# Patient Record
Sex: FEMALE | Race: WHITE | Employment: FULL TIME | URBAN - METROPOLITAN AREA
[De-identification: names, ages, dates, MRNs, and addresses within clinical notes are randomized per-mention and may not be internally consistent; named-entity substitution may affect disease eponyms.]

---

## 2017-01-27 ENCOUNTER — OFFICE VISIT (OUTPATIENT)
Dept: FAMILY MEDICINE CLINIC | Age: 57
End: 2017-01-27

## 2017-01-27 VITALS
SYSTOLIC BLOOD PRESSURE: 128 MMHG | DIASTOLIC BLOOD PRESSURE: 76 MMHG | HEIGHT: 65 IN | WEIGHT: 186.8 LBS | BODY MASS INDEX: 31.12 KG/M2 | TEMPERATURE: 98 F

## 2017-01-27 DIAGNOSIS — J01.10 ACUTE FRONTAL SINUSITIS, RECURRENCE NOT SPECIFIED: Primary | ICD-10-CM

## 2017-01-27 PROCEDURE — 99213 OFFICE O/P EST LOW 20 MIN: CPT | Performed by: INTERNAL MEDICINE

## 2017-01-27 RX ORDER — CEFUROXIME AXETIL 250 MG/1
250 TABLET ORAL 2 TIMES DAILY
Qty: 20 TABLET | Refills: 0 | Status: SHIPPED | OUTPATIENT
Start: 2017-01-27 | End: 2017-02-06

## 2017-01-27 ASSESSMENT — ENCOUNTER SYMPTOMS
ABDOMINAL PAIN: 0
RHINORRHEA: 1
SHORTNESS OF BREATH: 0
COUGH: 0
SINUS PRESSURE: 1

## 2017-05-24 ENCOUNTER — TELEPHONE (OUTPATIENT)
Dept: FAMILY MEDICINE CLINIC | Age: 57
End: 2017-05-24

## 2017-05-24 RX ORDER — SIMVASTATIN 40 MG
TABLET ORAL
Qty: 90 TABLET | Refills: 0 | Status: SHIPPED | OUTPATIENT
Start: 2017-05-24 | End: 2017-07-26

## 2017-06-13 RX ORDER — GLUCOSAMINE HCL/CHONDROITIN SU 500-400 MG
CAPSULE ORAL
Qty: 200 STRIP | Refills: 5 | Status: SHIPPED | OUTPATIENT
Start: 2017-06-13 | End: 2017-06-19 | Stop reason: DRUGHIGH

## 2017-06-19 ENCOUNTER — TELEPHONE (OUTPATIENT)
Dept: FAMILY MEDICINE CLINIC | Age: 57
End: 2017-06-19

## 2017-06-19 RX ORDER — GLUCOSAMINE HCL/CHONDROITIN SU 500-400 MG
CAPSULE ORAL
Qty: 200 STRIP | Refills: 5 | Status: SHIPPED | OUTPATIENT
Start: 2017-06-19 | End: 2018-08-06 | Stop reason: SDUPTHER

## 2017-07-26 ENCOUNTER — OFFICE VISIT (OUTPATIENT)
Dept: FAMILY MEDICINE CLINIC | Age: 57
End: 2017-07-26

## 2017-07-26 VITALS
SYSTOLIC BLOOD PRESSURE: 126 MMHG | WEIGHT: 188.8 LBS | TEMPERATURE: 97.9 F | HEIGHT: 65 IN | DIASTOLIC BLOOD PRESSURE: 74 MMHG | BODY MASS INDEX: 31.46 KG/M2

## 2017-07-26 DIAGNOSIS — E03.9 ACQUIRED HYPOTHYROIDISM: ICD-10-CM

## 2017-07-26 DIAGNOSIS — Z00.00 PHYSICAL EXAM, ANNUAL: Primary | ICD-10-CM

## 2017-07-26 DIAGNOSIS — M25.541 ARTHRALGIA OF BOTH HANDS: ICD-10-CM

## 2017-07-26 DIAGNOSIS — E10.9 TYPE 1 DIABETES MELLITUS WITHOUT COMPLICATION (HCC): ICD-10-CM

## 2017-07-26 DIAGNOSIS — Z11.59 NEED FOR HEPATITIS C SCREENING TEST: ICD-10-CM

## 2017-07-26 DIAGNOSIS — R53.83 FATIGUE, UNSPECIFIED TYPE: ICD-10-CM

## 2017-07-26 DIAGNOSIS — E78.00 PURE HYPERCHOLESTEROLEMIA: ICD-10-CM

## 2017-07-26 DIAGNOSIS — Z11.4 SCREENING FOR HIV (HUMAN IMMUNODEFICIENCY VIRUS): ICD-10-CM

## 2017-07-26 DIAGNOSIS — Z23 NEED FOR PNEUMOCOCCAL VACCINATION: ICD-10-CM

## 2017-07-26 DIAGNOSIS — M25.542 ARTHRALGIA OF BOTH HANDS: ICD-10-CM

## 2017-07-26 LAB
ALT SERPL-CCNC: 20 U/L (ref 10–40)
ANION GAP SERPL CALCULATED.3IONS-SCNC: 14 MMOL/L (ref 3–16)
AST SERPL-CCNC: 19 U/L (ref 15–37)
BASOPHILS ABSOLUTE: 0.1 K/UL (ref 0–0.2)
BASOPHILS RELATIVE PERCENT: 0.8 %
BUN BLDV-MCNC: 15 MG/DL (ref 7–20)
CALCIUM SERPL-MCNC: 9.9 MG/DL (ref 8.3–10.6)
CHLORIDE BLD-SCNC: 98 MMOL/L (ref 99–110)
CHOLESTEROL, TOTAL: 265 MG/DL (ref 0–199)
CO2: 28 MMOL/L (ref 21–32)
CREAT SERPL-MCNC: 0.9 MG/DL (ref 0.6–1.1)
EOSINOPHILS ABSOLUTE: 0.2 K/UL (ref 0–0.6)
EOSINOPHILS RELATIVE PERCENT: 3 %
GFR AFRICAN AMERICAN: >60
GFR NON-AFRICAN AMERICAN: >60
GLUCOSE BLD-MCNC: 88 MG/DL (ref 70–99)
HCT VFR BLD CALC: 40.8 % (ref 36–48)
HDLC SERPL-MCNC: 67 MG/DL (ref 40–60)
HEMOGLOBIN: 13.5 G/DL (ref 12–16)
HEPATITIS C ANTIBODY INTERPRETATION: NORMAL
LDL CHOLESTEROL CALCULATED: 182 MG/DL
LYMPHOCYTES ABSOLUTE: 2.2 K/UL (ref 1–5.1)
LYMPHOCYTES RELATIVE PERCENT: 31.8 %
MCH RBC QN AUTO: 28.8 PG (ref 26–34)
MCHC RBC AUTO-ENTMCNC: 33.1 G/DL (ref 31–36)
MCV RBC AUTO: 86.9 FL (ref 80–100)
MONOCYTES ABSOLUTE: 0.6 K/UL (ref 0–1.3)
MONOCYTES RELATIVE PERCENT: 8 %
NEUTROPHILS ABSOLUTE: 3.9 K/UL (ref 1.7–7.7)
NEUTROPHILS RELATIVE PERCENT: 56.4 %
PDW BLD-RTO: 14.3 % (ref 12.4–15.4)
PLATELET # BLD: 304 K/UL (ref 135–450)
PMV BLD AUTO: 8.6 FL (ref 5–10.5)
POTASSIUM SERPL-SCNC: 4.6 MMOL/L (ref 3.5–5.1)
RBC # BLD: 4.7 M/UL (ref 4–5.2)
RHEUMATOID FACTOR: <10 IU/ML
SEDIMENTATION RATE, ERYTHROCYTE: 19 MM/HR (ref 0–30)
SODIUM BLD-SCNC: 140 MMOL/L (ref 136–145)
TRIGL SERPL-MCNC: 78 MG/DL (ref 0–150)
TSH SERPL DL<=0.05 MIU/L-ACNC: 2.85 UIU/ML (ref 0.27–4.2)
VLDLC SERPL CALC-MCNC: 16 MG/DL
WBC # BLD: 7 K/UL (ref 4–11)

## 2017-07-26 PROCEDURE — 90732 PPSV23 VACC 2 YRS+ SUBQ/IM: CPT | Performed by: INTERNAL MEDICINE

## 2017-07-26 PROCEDURE — 90471 IMMUNIZATION ADMIN: CPT | Performed by: INTERNAL MEDICINE

## 2017-07-26 PROCEDURE — 99396 PREV VISIT EST AGE 40-64: CPT | Performed by: INTERNAL MEDICINE

## 2017-07-26 ASSESSMENT — ENCOUNTER SYMPTOMS
RHINORRHEA: 0
CONSTIPATION: 0
VOMITING: 0
ABDOMINAL PAIN: 0
BLOOD IN STOOL: 0
SINUS PRESSURE: 0
DIARRHEA: 0
NAUSEA: 0
COUGH: 0
SHORTNESS OF BREATH: 0
APNEA: 0
WHEEZING: 0

## 2017-07-26 ASSESSMENT — PATIENT HEALTH QUESTIONNAIRE - PHQ9
2. FEELING DOWN, DEPRESSED OR HOPELESS: 0
SUM OF ALL RESPONSES TO PHQ QUESTIONS 1-9: 0
SUM OF ALL RESPONSES TO PHQ9 QUESTIONS 1 & 2: 0
1. LITTLE INTEREST OR PLEASURE IN DOING THINGS: 0

## 2017-07-27 LAB
ESTIMATED AVERAGE GLUCOSE: 188.6 MG/DL
HBA1C MFR BLD: 8.2 %
HIV-1 AND HIV-2 ANTIBODIES: NORMAL

## 2017-09-27 ENCOUNTER — OFFICE VISIT (OUTPATIENT)
Dept: FAMILY MEDICINE CLINIC | Age: 57
End: 2017-09-27

## 2017-09-27 VITALS
BODY MASS INDEX: 30.96 KG/M2 | HEIGHT: 65 IN | DIASTOLIC BLOOD PRESSURE: 72 MMHG | SYSTOLIC BLOOD PRESSURE: 124 MMHG | TEMPERATURE: 98.3 F | WEIGHT: 185.8 LBS

## 2017-09-27 DIAGNOSIS — J40 BRONCHITIS: ICD-10-CM

## 2017-09-27 PROCEDURE — 99213 OFFICE O/P EST LOW 20 MIN: CPT | Performed by: INTERNAL MEDICINE

## 2017-09-27 RX ORDER — CEFUROXIME AXETIL 250 MG/1
250 TABLET ORAL 2 TIMES DAILY
Qty: 20 TABLET | Refills: 0 | Status: SHIPPED | OUTPATIENT
Start: 2017-09-27 | End: 2017-10-07

## 2017-09-27 RX ORDER — SIMVASTATIN 40 MG
40 TABLET ORAL DAILY
COMMUNITY
End: 2017-10-27 | Stop reason: SDUPTHER

## 2017-10-26 ENCOUNTER — OFFICE VISIT (OUTPATIENT)
Dept: FAMILY MEDICINE CLINIC | Age: 57
End: 2017-10-26

## 2017-10-26 VITALS
DIASTOLIC BLOOD PRESSURE: 84 MMHG | BODY MASS INDEX: 30.49 KG/M2 | HEIGHT: 65 IN | SYSTOLIC BLOOD PRESSURE: 130 MMHG | TEMPERATURE: 97.6 F | WEIGHT: 183 LBS

## 2017-10-26 DIAGNOSIS — J01.00 ACUTE NON-RECURRENT MAXILLARY SINUSITIS: Primary | ICD-10-CM

## 2017-10-26 PROCEDURE — 99213 OFFICE O/P EST LOW 20 MIN: CPT | Performed by: FAMILY MEDICINE

## 2017-10-26 RX ORDER — AMOXICILLIN AND CLAVULANATE POTASSIUM 562.5; 437.5; 62.5 MG/1; MG/1; MG/1
1 TABLET, FILM COATED, EXTENDED RELEASE ORAL 2 TIMES DAILY
Qty: 20 TABLET | Refills: 0 | Status: SHIPPED | OUTPATIENT
Start: 2017-10-26 | End: 2017-11-05

## 2017-10-26 NOTE — PROGRESS NOTES
Subjective:      Patient ID: Calixto Morrison is a 56 y.o. female. Patient presents with:  Congestion: still with chest congestion- bronchitis    Ill since labor day  Treated here and again at the [facility]  Recently    Sore throat. Ears feel clogged  pnd   Cough dry but was productive earlier  No fever no tobacco  works at a   No whooping cough  She tells me the glucose is much better  Nasal congestion and facial pain and sinus pain. She can feel the sinus pop on the left sides    The treatments did help especially in the lungs    ceftin and augmentin 7 days was used    YOB: 1960    Date of Visit:  10/26/2017     -- Zithromax (Azithromycin)     Current Outpatient Prescriptions:  simvastatin (ZOCOR) 40 MG tablet, Take 40 mg by mouth daily, Disp: , Rfl:   Glucose Blood (BLOOD GLUCOSE TEST STRIPS) STRP, FREESTYLE LITE test strips- test glucose twice daily re: DMII (E11.9), Disp: 200 strip, Rfl: 5  levothyroxine (LEVOTHROID) 50 MCG tablet, Take 1 tablet by mouth Monday, Wednesday, Friday, Disp: 180 tablet, Rfl: 1  hydrochlorothiazide (HYDRODIURIL) 25 MG tablet, TAKE 1 TABLET BY MOUTH DAILY AS NEEDED, Disp: 90 tablet, Rfl: 1  levothyroxine (SYNTHROID) 75 MCG tablet, TAKE 1 TABLET BY MOUTH TUESDAY, THURSDAY, SATURDAY, SUNDAY, Disp: 180 tablet, Rfl: 1  Ranitidine HCl (ZANTAC 75 PO), Take by mouth daily , Disp: , Rfl:   insulin 70-30 (NOVOLIN 70/30) (70-30) 100 UNIT/ML injection, 40 Units 2 times daily. , Disp: , Rfl:   aspirin 81 MG EC tablet, Take 1 by mouth every other day, Disp: , Rfl:     No current facility-administered medications for this visit.       ---------------------------               10/26/17                      1614         ---------------------------   BP:          130/84         Site:       Left Arm        Position:     Sitting        Cuff Size:   Large Adult      Temp:   97.6 °F (36.4 °C)   TempSrc:      Oral          Weight:  183 lb

## 2017-10-27 RX ORDER — SIMVASTATIN 40 MG
TABLET ORAL
Qty: 90 TABLET | Refills: 0 | Status: SHIPPED | OUTPATIENT
Start: 2017-10-27 | End: 2018-01-23 | Stop reason: SDUPTHER

## 2018-01-24 RX ORDER — SIMVASTATIN 40 MG
TABLET ORAL
Qty: 90 TABLET | Refills: 0 | Status: SHIPPED | OUTPATIENT
Start: 2018-01-24 | End: 2018-04-23 | Stop reason: SDUPTHER

## 2018-02-02 RX ORDER — LEVOTHYROXINE SODIUM 0.05 MG/1
TABLET ORAL
Qty: 180 TABLET | Refills: 1 | Status: SHIPPED | OUTPATIENT
Start: 2018-02-02

## 2018-02-02 RX ORDER — LEVOTHYROXINE SODIUM 0.07 MG/1
TABLET ORAL
Qty: 180 TABLET | Refills: 1 | Status: SHIPPED | OUTPATIENT
Start: 2018-02-02

## 2018-03-06 ENCOUNTER — OFFICE VISIT (OUTPATIENT)
Dept: FAMILY MEDICINE CLINIC | Age: 58
End: 2018-03-06

## 2018-03-06 VITALS
BODY MASS INDEX: 29.46 KG/M2 | SYSTOLIC BLOOD PRESSURE: 124 MMHG | DIASTOLIC BLOOD PRESSURE: 76 MMHG | TEMPERATURE: 98.2 F | HEIGHT: 65 IN | WEIGHT: 176.8 LBS

## 2018-03-06 DIAGNOSIS — J01.10 ACUTE FRONTAL SINUSITIS, RECURRENCE NOT SPECIFIED: Primary | ICD-10-CM

## 2018-03-06 PROCEDURE — 99213 OFFICE O/P EST LOW 20 MIN: CPT | Performed by: INTERNAL MEDICINE

## 2018-03-06 RX ORDER — CEFUROXIME AXETIL 250 MG/1
250 TABLET ORAL 2 TIMES DAILY
Qty: 20 TABLET | Refills: 0 | Status: SHIPPED | OUTPATIENT
Start: 2018-03-06 | End: 2018-03-16

## 2018-03-06 ASSESSMENT — ENCOUNTER SYMPTOMS
RHINORRHEA: 1
COUGH: 1
SHORTNESS OF BREATH: 0
APNEA: 0
SORE THROAT: 1
ABDOMINAL PAIN: 0

## 2018-04-04 ENCOUNTER — OFFICE VISIT (OUTPATIENT)
Dept: FAMILY MEDICINE CLINIC | Age: 58
End: 2018-04-04

## 2018-04-04 VITALS
BODY MASS INDEX: 30.82 KG/M2 | HEIGHT: 65 IN | WEIGHT: 185 LBS | TEMPERATURE: 97.4 F | DIASTOLIC BLOOD PRESSURE: 74 MMHG | SYSTOLIC BLOOD PRESSURE: 136 MMHG

## 2018-04-04 DIAGNOSIS — R05.9 COUGH: ICD-10-CM

## 2018-04-04 PROCEDURE — 99213 OFFICE O/P EST LOW 20 MIN: CPT | Performed by: INTERNAL MEDICINE

## 2018-04-04 ASSESSMENT — ENCOUNTER SYMPTOMS
BLOOD IN STOOL: 0
COUGH: 1
CONSTIPATION: 0
WHEEZING: 0
ABDOMINAL PAIN: 0

## 2018-04-24 RX ORDER — SIMVASTATIN 40 MG
TABLET ORAL
Qty: 90 TABLET | Refills: 0 | Status: SHIPPED | OUTPATIENT
Start: 2018-04-24 | End: 2018-08-06 | Stop reason: SDUPTHER

## 2018-05-04 PROBLEM — R05.9 COUGH: Status: RESOLVED | Noted: 2018-04-04 | Resolved: 2018-05-04

## 2018-06-13 DIAGNOSIS — E10.9 DM W/O COMPLICATION TYPE I (HCC): Primary | ICD-10-CM

## 2018-06-14 ENCOUNTER — HOSPITAL ENCOUNTER (OUTPATIENT)
Dept: CT IMAGING | Age: 58
Discharge: HOME | End: 2018-06-14

## 2018-06-14 DIAGNOSIS — J32.9 CHRONIC SINUSITIS: ICD-10-CM

## 2018-06-14 DIAGNOSIS — J32.9 CHRONIC SINUSITIS, UNSPECIFIED LOCATION: ICD-10-CM

## 2018-06-19 DIAGNOSIS — E10.9 DM W/O COMPLICATION TYPE I (HCC): ICD-10-CM

## 2018-06-20 LAB
CREATININE URINE: 93.9 MG/DL (ref 28–259)
MICROALBUMIN UR-MCNC: <1.2 MG/DL
MICROALBUMIN/CREAT UR-RTO: NORMAL MG/G (ref 0–30)

## 2018-08-06 RX ORDER — SIMVASTATIN 40 MG
TABLET ORAL
Qty: 90 TABLET | Refills: 0 | Status: SHIPPED | OUTPATIENT
Start: 2018-08-06 | End: 2018-11-04 | Stop reason: SDUPTHER

## 2018-08-06 RX ORDER — BLOOD-GLUCOSE METER
KIT MISCELLANEOUS
Qty: 200 EACH | Refills: 3 | Status: SHIPPED | OUTPATIENT
Start: 2018-08-06

## 2018-11-05 RX ORDER — SIMVASTATIN 40 MG
TABLET ORAL
Qty: 90 TABLET | Refills: 0 | Status: SHIPPED | OUTPATIENT
Start: 2018-11-05 | End: 2018-12-31 | Stop reason: SDUPTHER

## 2019-01-02 RX ORDER — SIMVASTATIN 40 MG
TABLET ORAL
Qty: 90 TABLET | Refills: 0 | Status: SHIPPED | OUTPATIENT
Start: 2019-01-02

## 2019-03-29 ENCOUNTER — HOSPITAL ENCOUNTER (EMERGENCY)
Age: 59
Discharge: HOME | End: 2019-03-29
Attending: EMERGENCY MEDICINE
Payer: COMMERCIAL

## 2019-03-29 DIAGNOSIS — E10.649 TYPE 1 DIABETES MELLITUS WITH HYPOGLYCEMIA AND WITHOUT COMA (HCC): ICD-10-CM

## 2019-03-29 DIAGNOSIS — E16.2 HYPOGLYCEMIA: Primary | ICD-10-CM

## 2019-03-29 LAB
A/G RATIO: 1.4 (ref 1.1–2.2)
ALBUMIN SERPL-MCNC: 4.2 G/DL (ref 3.4–5)
ALP BLD-CCNC: 73 U/L (ref 40–129)
ALT SERPL-CCNC: 20 U/L (ref 10–40)
ANION GAP SERPL CALCULATED.3IONS-SCNC: 11 MMOL/L (ref 3–16)
AST SERPL-CCNC: 20 U/L (ref 15–37)
BACTERIA: ABNORMAL /HPF
BASOPHILS ABSOLUTE: 0.1 K/UL (ref 0–0.2)
BASOPHILS RELATIVE PERCENT: 0.9 %
BILIRUB SERPL-MCNC: <0.2 MG/DL (ref 0–1)
BILIRUBIN URINE: NEGATIVE
BLOOD, URINE: NEGATIVE
BUN BLDV-MCNC: 11 MG/DL (ref 7–20)
CALCIUM SERPL-MCNC: 10.3 MG/DL (ref 8.3–10.6)
CHLORIDE BLD-SCNC: 104 MMOL/L (ref 99–110)
CHP ED QC CHECK: NORMAL
CHP ED QC CHECK: YES
CLARITY: CLEAR
CO2: 29 MMOL/L (ref 21–32)
COLOR: YELLOW
CREAT SERPL-MCNC: 0.7 MG/DL (ref 0.6–1.1)
EOSINOPHILS ABSOLUTE: 0.3 K/UL (ref 0–0.6)
EOSINOPHILS RELATIVE PERCENT: 3.7 %
EPITHELIAL CELLS, UA: ABNORMAL /HPF
GFR AFRICAN AMERICAN: >60
GFR NON-AFRICAN AMERICAN: >60
GLOBULIN: 3 G/DL
GLUCOSE BLD-MCNC: 147 MG/DL
GLUCOSE BLD-MCNC: 147 MG/DL (ref 70–99)
GLUCOSE BLD-MCNC: 58 MG/DL (ref 70–99)
GLUCOSE BLD-MCNC: 59 MG/DL
GLUCOSE BLD-MCNC: 59 MG/DL (ref 70–99)
GLUCOSE URINE: NEGATIVE MG/DL
HCT VFR BLD CALC: 39.3 % (ref 36–48)
HEMOGLOBIN: 12.7 G/DL (ref 12–16)
KETONES, URINE: NEGATIVE MG/DL
LEUKOCYTE ESTERASE, URINE: ABNORMAL
LYMPHOCYTES ABSOLUTE: 3 K/UL (ref 1–5.1)
LYMPHOCYTES RELATIVE PERCENT: 33.7 %
MCH RBC QN AUTO: 27.5 PG (ref 26–34)
MCHC RBC AUTO-ENTMCNC: 32.4 G/DL (ref 31–36)
MCV RBC AUTO: 84.7 FL (ref 80–100)
MICROSCOPIC EXAMINATION: YES
MONOCYTES ABSOLUTE: 0.7 K/UL (ref 0–1.3)
MONOCYTES RELATIVE PERCENT: 7.8 %
NEUTROPHILS ABSOLUTE: 4.7 K/UL (ref 1.7–7.7)
NEUTROPHILS RELATIVE PERCENT: 53.9 %
NITRITE, URINE: NEGATIVE
PDW BLD-RTO: 13.6 % (ref 12.4–15.4)
PERFORMED ON: ABNORMAL
PERFORMED ON: ABNORMAL
PH UA: 7.5 (ref 5–8)
PLATELET # BLD: 339 K/UL (ref 135–450)
PMV BLD AUTO: 8 FL (ref 5–10.5)
POTASSIUM REFLEX MAGNESIUM: 3.7 MMOL/L (ref 3.5–5.1)
PROTEIN UA: NEGATIVE MG/DL
RBC # BLD: 4.63 M/UL (ref 4–5.2)
RBC UA: ABNORMAL /HPF (ref 0–2)
SODIUM BLD-SCNC: 144 MMOL/L (ref 136–145)
SPECIFIC GRAVITY UA: 1.01 (ref 1–1.03)
TOTAL PROTEIN: 7.2 G/DL (ref 6.4–8.2)
TROPONIN: <0.01 NG/ML
TSH REFLEX: 3.38 UIU/ML (ref 0.27–4.2)
URINE REFLEX TO CULTURE: YES
URINE TYPE: ABNORMAL
UROBILINOGEN, URINE: 0.2 E.U./DL
WBC # BLD: 8.8 K/UL (ref 4–11)
WBC UA: ABNORMAL /HPF (ref 0–5)

## 2019-03-29 PROCEDURE — 82962 GLUCOSE BLOOD TEST: CPT

## 2019-03-29 PROCEDURE — 85025 COMPLETE CBC W/AUTO DIFF WBC: CPT

## 2019-03-29 PROCEDURE — 93005 ELECTROCARDIOGRAM TRACING: CPT | Performed by: EMERGENCY MEDICINE

## 2019-03-29 PROCEDURE — 2580000003 HC RX 258: Performed by: EMERGENCY MEDICINE

## 2019-03-29 PROCEDURE — 36415 COLL VENOUS BLD VENIPUNCTURE: CPT

## 2019-03-29 PROCEDURE — 81001 URINALYSIS AUTO W/SCOPE: CPT

## 2019-03-29 PROCEDURE — 84484 ASSAY OF TROPONIN QUANT: CPT

## 2019-03-29 PROCEDURE — 87086 URINE CULTURE/COLONY COUNT: CPT

## 2019-03-29 PROCEDURE — 96365 THER/PROPH/DIAG IV INF INIT: CPT

## 2019-03-29 PROCEDURE — 84443 ASSAY THYROID STIM HORMONE: CPT

## 2019-03-29 PROCEDURE — 99285 EMERGENCY DEPT VISIT HI MDM: CPT

## 2019-03-29 PROCEDURE — 80053 COMPREHEN METABOLIC PANEL: CPT

## 2019-03-29 RX ORDER — DEXTROSE MONOHYDRATE 100 MG/ML
INJECTION, SOLUTION INTRAVENOUS ONCE
Status: COMPLETED | OUTPATIENT
Start: 2019-03-29 | End: 2019-03-29

## 2019-03-29 RX ADMIN — DEXTROSE MONOHYDRATE: 100 INJECTION, SOLUTION INTRAVENOUS at 17:58

## 2019-03-29 ASSESSMENT — PAIN SCALES - GENERAL: PAINLEVEL_OUTOF10: 0

## 2019-03-29 ASSESSMENT — PAIN - FUNCTIONAL ASSESSMENT: PAIN_FUNCTIONAL_ASSESSMENT: 0-10

## 2019-03-29 NOTE — ED TRIAGE NOTES
Patient came to ER with complaints of feeling of irregular heart rhythm. Patient stated on and off for about 2 weeks. Patient stated BS 49 this morning and did not recheck blood sugar.

## 2019-03-29 NOTE — ED PROVIDER NOTES
hydrochlorothiazide (HYDRODIURIL) 25 MG tablet TAKE 1 TABLET BY MOUTH DAILY AS NEEDED 12/5/16   Waldemar Seal, DO       Allergies as of 03/29/2019 - Review Complete 03/29/2019   Allergen Reaction Noted    Zithromax [azithromycin]         Past Medical History:   Diagnosis Date    Diabetes mellitus type 1 ([facility] [address]     Hyperlipidemia     Thyroid disease         Surgical History:   Past Surgical History:   Procedure Laterality Date    BLADDER SUSPENSION      CHOLECYSTECTOMY  october 1991    HYSTERECTOMY  november 1998    partial    KNEE ARTHROSCOPY      right    OVARY REMOVAL      right    SINUS SURGERY          Family History:    Family History   Problem Relation Age of Onset    Stroke Maternal Grandmother     Asthma Maternal Grandmother     Stroke Maternal Grandfather        Social History     Socioeconomic History    Marital status:      Spouse name: Not on file    Number of children: Not on file    Years of education: Not on file    Highest education level: Not on file   Occupational History    Not on file   Social Needs    Financial resource strain: Not on file    Food insecurity:     Worry: Not on file     Inability: Not on file    Transportation needs:     Medical: Not on file     Non-medical: Not on file   Tobacco Use    Smoking status: Former Smoker    Smokeless tobacco: Never Used   Substance and Sexual Activity    Alcohol use: No     Alcohol/week: 0.0 oz    Drug use: No    Sexual activity: Not on file   Lifestyle    Physical activity:     Days per week: Not on file     Minutes per session: Not on file    Stress: Not on file   Relationships    Social connections:     Talks on phone: Not on file     Gets together: Not on file     Attends Orthodoxy service: Not on file     Active member of club or organization: Not on file     Attends meetings of clubs or organizations: Not on file     Relationship status: Not on file    Intimate partner violence:     Fear of current or ex partner: Not on file     Emotionally abused: Not on file     Physically abused: Not on file     Forced sexual activity: Not on file   Other Topics Concern    Not on file   Social History Narrative    Not on file       Nursing notes reviewed. ED Triage Vitals   Enc Vitals Group      BP 03/29/19 1715 (!) 173/85      Pulse 03/29/19 1715 64      Resp 03/29/19 1715 16      Temp 03/29/19 1813 98.9 °F (37.2 °C)      Temp Source 03/29/19 1813 Oral      SpO2 03/29/19 1715 100 %      Weight 03/29/19 1802 198 lb 3.1 oz (89.9 kg)      Height 03/29/19 1802 5' 5\" (1.651 m)      Head Circumference --       Peak Flow --       Pain Score --       Pain Loc --       Pain Edu? --       Excl. in GC? --        GENERAL:  Awake, alert. Well developed, well nourished with no apparent distress. HENT:  Normocephalic, Atraumatic, moist mucous membranes. EYES:  Pupils equal round and reactive to light, Conjunctiva normal, extraocular movements normal.  NECK:  No meningeal signs, Supple. CHEST:  Regular rate and rhythm, chest wall non-tender. LUNGS:  Clear to auscultation bilaterally, no respiratory distress. ABDOMEN:  Soft, non-tender, no rebound, rigidity or guarding, non-distended, normal bowel sounds. No costovertebral angle tenderness to palpation. EXTREMITIES:  Normal range of motion, no edema, no tenderness, no deformity, distal pulses present. BACK:  No tenderness. SKIN: Warm, dry and intact. NEUROLOGIC: Normal mental status. Moving all extremities to command. LABS and DIAGNOSTIC RESULTS  EKG  The Ekg interpreted by me shows  normal sinus rhythm with a rate of 65  Axis is   Normal  QTc is  normal  Intervals and Durations are unremarkable. ST Segments: normal  Delta waves, Brugada Syndrome, and Short CO are not present. No prior EKG available for comparison.     LABS  Labs Reviewed   URINE RT REFLEX TO CULTURE - Abnormal; Notable for the following components:       Result Value    Leukocyte Esterase, Urine SMALL (*)     All other components within normal limits    Narrative:     Performed at:  [facility]  [address]   Phone [phone]   COMPREHENSIVE METABOLIC PANEL W/ REFLEX TO MG FOR LOW K - Abnormal; Notable for the following components:    Glucose 58 (*)     All other components within normal limits    Narrative:     Performed at:  [facility]  [address]   Phone [phone]   MICROSCOPIC URINALYSIS - Abnormal; Notable for the following components:    Bacteria, UA Rare (*)     All other components within normal limits    Narrative:     Performed at:  [facility]  [address]   Phone [phone]   POCT GLUCOSE - Abnormal; Notable for the following components:    POC Glucose 59 (*)     All other components within normal limits    Narrative:     Performed at:  [facility]  [address]   Phone [phone]   URINE CULTURE   CBC WITH AUTO DIFFERENTIAL    Narrative:     Performed at:  [facility]  [address]   Phone [phone]   TROPONIN    Narrative:     Performed at:  [address] [place] [address] Laboratory  [address]   Phone [phone]   TSH WITH REFLEX   POCT GLUCOSE       MEDICAL DECISION MAKING     The total Critical Care time is 40 minutes which excludes separately billable procedures. The critical care was concerning treatment of hypoglycemia with IV D10. This time is exclusive of any time documented by any other providers. Patient's blood sugar here was in the 50s. Given that the only 2 times it's been checked today have both been hypoglycemic I believe her symptoms are due to hypoglycemia.   I advised her to no longer give herself 30 units at a time but rather to check her sugar 4 times a day and give herself about 15 units each time. I also advised her that we will refer her to an endocrinologist and her new primary physician. I advised her to return to the ER for new or worsening symptoms. After being held in the ER for a couple hours and receiving D10 through the IV she has normal blood sugar and this is eating food. I estimate there is LOW risk for ACUTE CORONARY SYNDROME, INTRACRANIAL HEMORRHAGE, MALIGNANT DYSRHYTHMIA, MENINGITIS, PNEUMONIA, PULMONARY EMBOLISM, SEPSIS, SUBARACHNOID HEMORRHAGE, SUBDURAL HEMATOMA, STROKE, or URINARY TRACT INFECTION, thus I consider the discharge disposition reasonable. [address] and I have discussed the diagnosis and risks, and we agree with discharging home to follow-up with their primary doctor. We also discussed returning to the Emergency Department immediately if new or worsening symptoms occur. We have discussed the symptoms which are most concerning (e.g., changing or worsening pain, weakness, vomiting, fever) that necessitate immediate return. Final Impression    1. Hypoglycemia    2. Type 1 diabetes mellitus with hypoglycemia and without coma (HCC)        Blood pressure (!) 147/69, pulse (!) 48, temperature 98.9 °F (37.2 °C), temperature source Oral, resp. rate 13, height 5' 5\" (1.651 m), weight 198 lb 3.1 oz (89.9 kg), SpO2 99 %, not currently breastfeeding. Patient was given scripts for the following medications. I counseled patient how to take these medications. New Prescriptions    No medications on file       Disposition  Pt is in good condition upon Discharge to home. This chart was generated using the [address] [place] [address] [address] dictation system. I created this record but it may contain dictation errors.           Don Mckinnon MD  03/29/19 1111

## 2019-03-30 VITALS
SYSTOLIC BLOOD PRESSURE: 148 MMHG | RESPIRATION RATE: 14 BRPM | BODY MASS INDEX: 33.02 KG/M2 | DIASTOLIC BLOOD PRESSURE: 85 MMHG | OXYGEN SATURATION: 99 % | HEIGHT: 65 IN | WEIGHT: 198.19 LBS | HEART RATE: 62 BPM | TEMPERATURE: 98.9 F

## 2019-03-30 LAB
EKG ATRIAL RATE: 65 BPM
EKG DIAGNOSIS: NORMAL
EKG P AXIS: 18 DEGREES
EKG P-R INTERVAL: 176 MS
EKG Q-T INTERVAL: 416 MS
EKG QRS DURATION: 80 MS
EKG QTC CALCULATION (BAZETT): 432 MS
EKG R AXIS: -14 DEGREES
EKG T AXIS: 71 DEGREES
EKG VENTRICULAR RATE: 65 BPM
URINE CULTURE, ROUTINE: NORMAL

## 2019-03-30 PROCEDURE — 93010 ELECTROCARDIOGRAM REPORT: CPT | Performed by: INTERNAL MEDICINE

## 2019-04-01 ENCOUNTER — TELEPHONE (OUTPATIENT)
Dept: ENDOCRINOLOGY | Age: 59
End: 2019-04-01

## 2019-04-01 NOTE — TELEPHONE ENCOUNTER
Pt called stating she was in the ER Friday evening for Hypoglycemia. Pt has a referral to see Dr. Osvaldo Prieto. The soonest available NP visit is 6/27/19 at [facility] location. Would you like to get her in sooner and if so when?

## 2019-04-01 NOTE — TELEPHONE ENCOUNTER
Patient can see me in [facility] office sooner or we can also place on cancellation list

## 2019-06-27 ENCOUNTER — OFFICE VISIT (OUTPATIENT)
Dept: ENDOCRINOLOGY | Age: 59
End: 2019-06-27
Payer: COMMERCIAL

## 2019-06-27 ENCOUNTER — TELEPHONE (OUTPATIENT)
Dept: ENDOCRINOLOGY | Age: 59
End: 2019-06-27

## 2019-06-27 VITALS
RESPIRATION RATE: 16 BRPM | OXYGEN SATURATION: 99 % | HEART RATE: 64 BPM | DIASTOLIC BLOOD PRESSURE: 62 MMHG | BODY MASS INDEX: 32.26 KG/M2 | HEIGHT: 65 IN | SYSTOLIC BLOOD PRESSURE: 138 MMHG | WEIGHT: 193.6 LBS

## 2019-06-27 DIAGNOSIS — E10.65 UNCONTROLLED TYPE 1 DIABETES MELLITUS WITH HYPERGLYCEMIA (HCC): Primary | ICD-10-CM

## 2019-06-27 DIAGNOSIS — E03.9 ACQUIRED HYPOTHYROIDISM: ICD-10-CM

## 2019-06-27 LAB
CREATININE URINE: 109.6 MG/DL (ref 28–259)
HBA1C MFR BLD: 8.2 %
MICROALBUMIN UR-MCNC: <1.2 MG/DL
MICROALBUMIN/CREAT UR-RTO: NORMAL MG/G (ref 0–30)

## 2019-06-27 PROCEDURE — 83036 HEMOGLOBIN GLYCOSYLATED A1C: CPT | Performed by: INTERNAL MEDICINE

## 2019-06-27 PROCEDURE — 99205 OFFICE O/P NEW HI 60 MIN: CPT | Performed by: INTERNAL MEDICINE

## 2019-06-27 NOTE — TELEPHONE ENCOUNTER
Patient said that the lispro is not covered by her insurance and needs a PA.  She wanted to know if she should continue with the Lantus

## 2019-06-27 NOTE — PROGRESS NOTES
Seen as new patient for diabetes    Diagnosed with Type 1 diabetes mellitus 32 years ago  Known diabetic complications: none   Uncontrolled with lows    Referred by ER due to hypoglycemia  Has not seen endocrinology    Current diabetic medications   On insulin 1 year after diagnosis    Novolin 70/30   She was doing 40/40 but dose last changed to 15 units as needed  She takes about 30 units a day    Never on insulin pump    On this insulin for 25 years    Last A1c   8.2%<------9% <--- 8.2 <------ 8.8    Prior visit with dietician: Yes   Current diet: on average, 3 meals per day   Current exercise: walking   Current monitoring regimen: home blood tests -   4    Has brought blood glucose log/meter: No   Home blood sugar records: 97-200s  Any episodes of hypoglycemia?   < 40    No Hx of CAD , PVD, CVA    She has  Hyperlipidemia  Taking simvastatin 40mg    Last eye exam: 1 year ago  Last foot exam: 6/19  Last microalbumin to creatinine ratio: 6/18    She has a h/o hypothyroidism  Alternative 50 mcg and 75mcg    C/o sweats and flushing    3/19 TSH 3.38      Past Medical History:   Diagnosis Date    [facility] mellitus [facility] 1 ([facility] [address]     Hyperlipidemia     Thyroid disease      Past Surgical History:   Procedure Laterality Date    BLADDER SUSPENSION      CHOLECYSTECTOMY  october 1991    HYSTERECTOMY  november 1998    partial    KNEE ARTHROSCOPY      right    OVARY REMOVAL      right    SINUS SURGERY       Current Outpatient Medications   Medication Sig Dispense Refill    POTASSIUM CHLORIDE PO Take by mouth      simvastatin (ZOCOR) 40 MG tablet TAKE 1 TABLET DAILY 90 tablet 0    FREESTYLE LITE strip TEST GLUCOSE TWICE A  each 3    levothyroxine (SYNTHROID) 50 MCG tablet TAKE 1 TABLET ON MONDAY, WEDNESDAY AND FRIDAY 180 tablet 1    levothyroxine (SYNTHROID) 75 MCG tablet TAKE 1 TABLET ON TUESDAY, THURSDAY, SATURDAY AND SUNDAY 180 tablet 1    Ranitidine HCl (ZANTAC 75 PO) Take by mouth daily       insulin 70-30 (NOVOLIN 70/30) (70-30) 100 UNIT/ML injection 15 Units 4 times daily       aspirin 81 MG EC tablet Take 1 by mouth every other day       No current facility-administered medications for this visit. Review of Systems  Please see scanned document dated and signed      Objective:      /62 (Site: Left Upper Arm, Position: Sitting, Cuff Size: Large Adult)   Pulse 64   Resp 16   Ht 5' 5\" (1.651 m)   Wt 193 lb 9.6 oz (87.8 kg)   LMP  (LMP Unknown)   SpO2 99%   Breastfeeding? No   BMI 32.22 kg/m²   Wt Readings from Last 3 Encounters:   06/27/19 193 lb 9.6 oz (87.8 kg)   03/29/19 198 lb 3.1 oz (89.9 kg)   04/04/18 185 lb (83.9 kg)     Constitutional: Well-developed, alert, appears stated age, cooperative, in no acute distress  H/E/N/M/T:atraumatic, normocephalic, external ears, nose, lips normal without lesions  Eyes: Arcus Senilis is not present, extraocular muscles are intact  Neck: supple, trachea midline, acanthosis nigricance is not present. Thyroid: gland size is normal, non-tender on palpation  Respiratory: breathing is unlabored, lungs are clear to auscultations. Cardiovascular: regular rate and rhythm, S1, S2, regular rate and rhythm, no murmur, rub or gallop.    Skeletal muscular: no kyphosis, no gross abnormalities  Skin: Xanthoma/Xanthelasmas are  not present  Psychiatric: Judgement and Insight:  judgement and insight appear normal  Neuro: Normal without focal findings, speech is spontaneous, and movements are coordinated, alert and oriented x3   Skeletal foot exam is normal, no skin lesions, toenails are normal, DP palpable on right, 10 g monofilament is 10/10    Lab Reviewed   No components found for: CHLPL  Lab Results   Component Value Date    TRIG 78 07/26/2017    TRIG 104 09/27/2016    TRIG 68 04/03/2015     Lab Results   Component Value Date    HDL 67 (H) 07/26/2017    HDL 52 09/27/2016    HDL 61 (H) 04/03/2015     Lab Results   Component Value Date    LDLCALC 182 (H) 07/26/2017    LDLCALC 70 09/27/2016    LDLCALC 130 (H) 04/03/2015     Lab Results   Component Value Date    LABVLDL 16 07/26/2017    LABVLDL 21 09/27/2016    LABVLDL 14 04/03/2015     Lab Results   Component Value Date    LABA1C 9.0 (H) 07/02/2018       Assessment:     Dashawn Tracy is a 66 y.o. female with :    1.T1DM:May have SAGRARIO. Longstanding, uncontrolled with hypoglycemia. Discussed goals. Advised to switch novolin 70/30 to basal bolus. Discussed CGM, may consider. Also advised can consider insulin pump  2. Hypothyroidism: Last TSH at goal  3. HLD: On statin       Plan:      Stop Novolin 70/30   Start lantus 18 unit daily   Novolog 6 units AC TID  SSI 1 for 50 > 150   Advise to check blood sugar 4 times a day   Patient to send blood sugar log for titration. Advise to exercise regularly. Advise to low simple carbohydrate and protein with each  meal diet. Diabetes Care: recommend yearly eye exam, foot exam and urine microalbumin to   creatinine ratio. Patient is up-to-date.

## 2019-06-28 ENCOUNTER — TELEPHONE (OUTPATIENT)
Dept: ENDOCRINOLOGY | Age: 59
End: 2019-06-28

## 2019-07-20 ENCOUNTER — APPOINTMENT (OUTPATIENT)
Dept: GENERAL RADIOLOGY | Age: 59
End: 2019-07-20
Payer: COMMERCIAL

## 2019-07-20 ENCOUNTER — HOSPITAL ENCOUNTER (EMERGENCY)
Age: 59
Discharge: HOME | End: 2019-07-20
Attending: EMERGENCY MEDICINE
Payer: COMMERCIAL

## 2019-07-20 VITALS
RESPIRATION RATE: 16 BRPM | OXYGEN SATURATION: 96 % | HEIGHT: 65 IN | WEIGHT: 190.26 LBS | HEART RATE: 64 BPM | BODY MASS INDEX: 31.7 KG/M2 | DIASTOLIC BLOOD PRESSURE: 82 MMHG | TEMPERATURE: 97.8 F | SYSTOLIC BLOOD PRESSURE: 141 MMHG

## 2019-07-20 DIAGNOSIS — M79.672 LEFT FOOT PAIN: Primary | ICD-10-CM

## 2019-07-20 PROCEDURE — 99283 EMERGENCY DEPT VISIT LOW MDM: CPT

## 2019-07-20 PROCEDURE — 6370000000 HC RX 637 (ALT 250 FOR IP): Performed by: EMERGENCY MEDICINE

## 2019-07-20 PROCEDURE — 73630 X-RAY EXAM OF FOOT: CPT

## 2019-07-20 RX ORDER — NAPROXEN 500 MG/1
500 TABLET ORAL 2 TIMES DAILY
Qty: 14 TABLET | Refills: 0 | Status: ON HOLD | OUTPATIENT
Start: 2019-07-20 | End: 2019-07-30

## 2019-07-20 RX ORDER — NAPROXEN 250 MG/1
500 TABLET ORAL ONCE
Status: COMPLETED | OUTPATIENT
Start: 2019-07-20 | End: 2019-07-20

## 2019-07-20 RX ADMIN — NAPROXEN 500 MG: 250 TABLET ORAL at 21:43

## 2019-07-20 ASSESSMENT — PAIN DESCRIPTION - ORIENTATION: ORIENTATION: LEFT

## 2019-07-20 ASSESSMENT — PAIN DESCRIPTION - DESCRIPTORS: DESCRIPTORS: ACHING

## 2019-07-20 ASSESSMENT — PAIN DESCRIPTION - LOCATION: LOCATION: FOOT

## 2019-07-20 ASSESSMENT — PAIN SCALES - GENERAL
PAINLEVEL_OUTOF10: 9
PAINLEVEL_OUTOF10: 4
PAINLEVEL_OUTOF10: 9

## 2019-07-20 ASSESSMENT — ENCOUNTER SYMPTOMS
SHORTNESS OF BREATH: 0
COLOR CHANGE: 0
BACK PAIN: 0
ABDOMINAL PAIN: 0
VOICE CHANGE: 0
TROUBLE SWALLOWING: 0

## 2019-07-20 ASSESSMENT — PAIN - FUNCTIONAL ASSESSMENT: PAIN_FUNCTIONAL_ASSESSMENT: 0-10

## 2019-07-20 ASSESSMENT — PAIN DESCRIPTION - PAIN TYPE: TYPE: ACUTE PAIN

## 2019-07-21 NOTE — ED PROVIDER NOTES
dry. Capillary refill takes less than 2 seconds. Psychiatric: She has a normal mood and affect. Nursing note and vitals reviewed. DIAGNOSTIC RESULTS   LABS:    Labs Reviewed - No data to display    All other labs were within normal range or not returned as of this dictation. EKG: All EKG's are interpreted by the Emergency Department Physician who either signs orCo-signs this chart in the absence of a cardiologist.  Please see their note for interpretation of EKG. RADIOLOGY:   Non-plain film images such as CT, Ultrasound and MRI are read by the radiologist. Plain radiographic images are visualized andpreliminarily interpreted by the  ED Provider with the below findings:        Interpretation per the Radiologist below, if available at the time ofthis note:    XR FOOT LEFT (MIN 3 VIEWS)   Final Result   1. No acute fracture identified. 2. Mild-to-moderate osteoarthritis of the 1st MTP joint with small osseous   bunion. 3. Soft tissue edema. No results found. PROCEDURES   Unless otherwise noted below, none     Procedures    CRITICAL CARE TIME   N/A    CONSULTS:  None      EMERGENCY DEPARTMENT COURSE and DIFFERENTIAL DIAGNOSIS/MDM:   Vitals:    Vitals:    07/20/19 2118   BP: (!) 141/82   Pulse: 64   Resp: 16   Temp: 97.8 °F (36.6 °C)   TempSrc: Oral   SpO2: 96%   Weight: 190 lb 4.1 oz (86.3 kg)   Height: 5' 5\" (1.651 m)       Patient was given the following medications:  Medications   naproxen (NAPROSYN) tablet 500 mg (500 mg Oral Given 7/20/19 2143)         Left foot pain versus left foot injury versus left foot fracture versus left foot dislocation versus foreign body    Imaging study shows no signs of any jeanette fracture or dislocation or foreign body. Patient will be placed in Ortho shoe crutches placed on Naprosyn for pain. Follow-up with her PMD.  Discharged in stable interactive condition after reevaluation per ER MD see chart for details.     The patient tolerated their visit well.  Adelfo Nearing seen and evaluated by the attending physician, Bao Duque MD who agreed with the assessment and plan. The patient and / or the family were informed of the results of any tests, a time was given to answer questions, aplan was proposed and they agreed with plan. FINAL IMPRESSION      1.  Left foot pain          DISPOSITION/PLAN   DISPOSITION        PATIENT REFERRED TO:  Clay Essex, MD  [address] [place] [address]  [phone]    In 1 week        DISCHARGE MEDICATIONS:  New Prescriptions    NAPROXEN (NAPROSYN) 500 MG TABLET    Take 1 tablet by mouth 2 times daily for 14 doses One tab twice a day when necessary pain       DISCONTINUED MEDICATIONS:  Discontinued Medications    No medications on file              (Please note that portions of this note were completed with a voice recognition program.  Efforts were made to edit the dictations but occasionally words aremis-transcribed.)    Cliff Lang MD (electronically signed)            Bao Duque MD  07/20/19 1530

## 2019-07-26 ENCOUNTER — HOSPITAL ENCOUNTER (EMERGENCY)
Age: 59
Discharge: HOME | DRG: 982 | End: 2019-07-26
Payer: COMMERCIAL

## 2019-07-26 ENCOUNTER — APPOINTMENT (OUTPATIENT)
Dept: GENERAL RADIOLOGY | Age: 59
DRG: 982 | End: 2019-07-26
Payer: COMMERCIAL

## 2019-07-26 VITALS
HEART RATE: 64 BPM | HEIGHT: 65 IN | BODY MASS INDEX: 31.66 KG/M2 | DIASTOLIC BLOOD PRESSURE: 81 MMHG | SYSTOLIC BLOOD PRESSURE: 169 MMHG | OXYGEN SATURATION: 98 % | TEMPERATURE: 97.8 F

## 2019-07-26 DIAGNOSIS — L08.9 LEFT FOOT INFECTION: Primary | ICD-10-CM

## 2019-07-26 PROCEDURE — 99283 EMERGENCY DEPT VISIT LOW MDM: CPT

## 2019-07-26 PROCEDURE — 73630 X-RAY EXAM OF FOOT: CPT

## 2019-07-26 PROCEDURE — 6370000000 HC RX 637 (ALT 250 FOR IP): Performed by: NURSE PRACTITIONER

## 2019-07-26 RX ORDER — OXYCODONE HYDROCHLORIDE AND ACETAMINOPHEN 5; 325 MG/1; MG/1
2 TABLET ORAL ONCE
Status: COMPLETED | OUTPATIENT
Start: 2019-07-26 | End: 2019-07-26

## 2019-07-26 RX ORDER — IBUPROFEN 800 MG/1
800 TABLET ORAL EVERY 8 HOURS PRN
Qty: 30 TABLET | Refills: 0 | Status: SHIPPED | OUTPATIENT
Start: 2019-07-26

## 2019-07-26 RX ORDER — CLINDAMYCIN HYDROCHLORIDE 150 MG/1
450 CAPSULE ORAL ONCE
Status: COMPLETED | OUTPATIENT
Start: 2019-07-26 | End: 2019-07-26

## 2019-07-26 RX ORDER — OXYCODONE HYDROCHLORIDE AND ACETAMINOPHEN 5; 325 MG/1; MG/1
1 TABLET ORAL EVERY 6 HOURS PRN
Qty: 15 TABLET | Refills: 0 | Status: ON HOLD | OUTPATIENT
Start: 2019-07-26 | End: 2019-08-06

## 2019-07-26 RX ORDER — CLINDAMYCIN HYDROCHLORIDE 150 MG/1
450 CAPSULE ORAL 3 TIMES DAILY
Qty: 90 CAPSULE | Refills: 0 | Status: ON HOLD | OUTPATIENT
Start: 2019-07-26 | End: 2019-08-06 | Stop reason: HOSPADM

## 2019-07-26 RX ADMIN — OXYCODONE HYDROCHLORIDE AND ACETAMINOPHEN 2 TABLET: 5; 325 TABLET ORAL at 03:11

## 2019-07-26 RX ADMIN — CLINDAMYCIN HYDROCHLORIDE 450 MG: 150 CAPSULE ORAL at 03:11

## 2019-07-26 ASSESSMENT — PAIN SCALES - GENERAL
PAINLEVEL_OUTOF10: 10
PAINLEVEL_OUTOF10: 10
PAINLEVEL_OUTOF10: 7

## 2019-07-26 ASSESSMENT — PAIN DESCRIPTION - LOCATION: LOCATION: FOOT

## 2019-07-26 ASSESSMENT — ENCOUNTER SYMPTOMS: COLOR CHANGE: 1

## 2019-07-26 ASSESSMENT — PAIN DESCRIPTION - ONSET: ONSET: ON-GOING

## 2019-07-26 ASSESSMENT — PAIN DESCRIPTION - ORIENTATION: ORIENTATION: LEFT

## 2019-07-26 ASSESSMENT — PAIN DESCRIPTION - PROGRESSION
CLINICAL_PROGRESSION: GRADUALLY IMPROVING
CLINICAL_PROGRESSION: GRADUALLY WORSENING

## 2019-07-26 ASSESSMENT — PAIN - FUNCTIONAL ASSESSMENT: PAIN_FUNCTIONAL_ASSESSMENT: PREVENTS OR INTERFERES SOME ACTIVE ACTIVITIES AND ADLS

## 2019-07-26 ASSESSMENT — PAIN DESCRIPTION - PAIN TYPE: TYPE: ACUTE PAIN

## 2019-07-26 ASSESSMENT — PAIN DESCRIPTION - DESCRIPTORS: DESCRIPTORS: ACHING

## 2019-07-26 ASSESSMENT — PAIN DESCRIPTION - FREQUENCY: FREQUENCY: CONTINUOUS

## 2019-07-29 ENCOUNTER — HOSPITAL ENCOUNTER (INPATIENT)
Age: 59
LOS: 8 days | Discharge: HOME | DRG: 982 | End: 2019-08-06
Attending: INTERNAL MEDICINE | Admitting: INTERNAL MEDICINE
Payer: COMMERCIAL

## 2019-07-29 DIAGNOSIS — L08.9 LEFT FOOT INFECTION: ICD-10-CM

## 2019-07-29 PROBLEM — L03.116 CELLULITIS OF LEFT FOOT: Status: ACTIVE | Noted: 2019-07-29

## 2019-07-29 LAB
ALBUMIN SERPL-MCNC: 3.9 G/DL (ref 3.4–5)
ALP BLD-CCNC: 66 U/L (ref 40–129)
ALT SERPL-CCNC: 10 U/L (ref 10–40)
ANION GAP SERPL CALCULATED.3IONS-SCNC: 12 MMOL/L (ref 3–16)
AST SERPL-CCNC: 14 U/L (ref 15–37)
BASOPHILS ABSOLUTE: 0.1 K/UL (ref 0–0.2)
BASOPHILS RELATIVE PERCENT: 0.6 %
BILIRUB SERPL-MCNC: <0.2 MG/DL (ref 0–1)
BILIRUBIN DIRECT: <0.2 MG/DL (ref 0–0.3)
BILIRUBIN, INDIRECT: ABNORMAL MG/DL (ref 0–1)
BUN BLDV-MCNC: 21 MG/DL (ref 7–20)
C-REACTIVE PROTEIN: 13.9 MG/L (ref 0–5.1)
CALCIUM SERPL-MCNC: 9.8 MG/DL (ref 8.3–10.6)
CHLORIDE BLD-SCNC: 100 MMOL/L (ref 99–110)
CO2: 26 MMOL/L (ref 21–32)
CREAT SERPL-MCNC: 0.9 MG/DL (ref 0.6–1.1)
EOSINOPHILS ABSOLUTE: 0.2 K/UL (ref 0–0.6)
EOSINOPHILS RELATIVE PERCENT: 1.3 %
GFR AFRICAN AMERICAN: >60
GFR NON-AFRICAN AMERICAN: >60
GLUCOSE BLD-MCNC: 227 MG/DL (ref 70–99)
GLUCOSE BLD-MCNC: 243 MG/DL (ref 70–99)
HCT VFR BLD CALC: 37.5 % (ref 36–48)
HEMOGLOBIN: 12.2 G/DL (ref 12–16)
INR BLD: 1.08 (ref 0.86–1.14)
LYMPHOCYTES ABSOLUTE: 2.6 K/UL (ref 1–5.1)
LYMPHOCYTES RELATIVE PERCENT: 22.1 %
MAGNESIUM: 2.2 MG/DL (ref 1.8–2.4)
MCH RBC QN AUTO: 28.2 PG (ref 26–34)
MCHC RBC AUTO-ENTMCNC: 32.5 G/DL (ref 31–36)
MCV RBC AUTO: 86.6 FL (ref 80–100)
MONOCYTES ABSOLUTE: 0.7 K/UL (ref 0–1.3)
MONOCYTES RELATIVE PERCENT: 6.4 %
NEUTROPHILS ABSOLUTE: 8 K/UL (ref 1.7–7.7)
NEUTROPHILS RELATIVE PERCENT: 69.6 %
PDW BLD-RTO: 14.2 % (ref 12.4–15.4)
PERFORMED ON: ABNORMAL
PHOSPHORUS: 3.3 MG/DL (ref 2.5–4.9)
PLATELET # BLD: 294 K/UL (ref 135–450)
PMV BLD AUTO: 8.5 FL (ref 5–10.5)
POTASSIUM SERPL-SCNC: 5.1 MMOL/L (ref 3.5–5.1)
PROTHROMBIN TIME: 12.3 SEC (ref 9.8–13)
RBC # BLD: 4.33 M/UL (ref 4–5.2)
REASON FOR REJECTION: NORMAL
REJECTED TEST: NORMAL
SEDIMENTATION RATE, ERYTHROCYTE: 29 MM/HR (ref 0–30)
SODIUM BLD-SCNC: 138 MMOL/L (ref 136–145)
TOTAL PROTEIN: 6.8 G/DL (ref 6.4–8.2)
WBC # BLD: 11.6 K/UL (ref 4–11)

## 2019-07-29 PROCEDURE — 83735 ASSAY OF MAGNESIUM: CPT

## 2019-07-29 PROCEDURE — 80076 HEPATIC FUNCTION PANEL: CPT

## 2019-07-29 PROCEDURE — 6370000000 HC RX 637 (ALT 250 FOR IP): Performed by: INTERNAL MEDICINE

## 2019-07-29 PROCEDURE — 1200000000 HC SEMI PRIVATE

## 2019-07-29 PROCEDURE — 87205 SMEAR GRAM STAIN: CPT

## 2019-07-29 PROCEDURE — 6360000002 HC RX W HCPCS: Performed by: INTERNAL MEDICINE

## 2019-07-29 PROCEDURE — 86140 C-REACTIVE PROTEIN: CPT

## 2019-07-29 PROCEDURE — 85610 PROTHROMBIN TIME: CPT

## 2019-07-29 PROCEDURE — 87040 BLOOD CULTURE FOR BACTERIA: CPT

## 2019-07-29 PROCEDURE — 86403 PARTICLE AGGLUT ANTBDY SCRN: CPT

## 2019-07-29 PROCEDURE — 87070 CULTURE OTHR SPECIMN AEROBIC: CPT

## 2019-07-29 PROCEDURE — 85025 COMPLETE CBC W/AUTO DIFF WBC: CPT

## 2019-07-29 PROCEDURE — 87186 SC STD MICRODIL/AGAR DIL: CPT

## 2019-07-29 PROCEDURE — 36415 COLL VENOUS BLD VENIPUNCTURE: CPT

## 2019-07-29 PROCEDURE — 85652 RBC SED RATE AUTOMATED: CPT

## 2019-07-29 PROCEDURE — 80069 RENAL FUNCTION PANEL: CPT

## 2019-07-29 PROCEDURE — 87077 CULTURE AEROBIC IDENTIFY: CPT

## 2019-07-29 RX ORDER — IBUPROFEN 600 MG/1
600 TABLET ORAL EVERY 6 HOURS PRN
Status: DISCONTINUED | OUTPATIENT
Start: 2019-07-29 | End: 2019-08-01

## 2019-07-29 RX ORDER — ASPIRIN 81 MG/1
81 TABLET ORAL DAILY
Status: DISCONTINUED | OUTPATIENT
Start: 2019-07-30 | End: 2019-08-06 | Stop reason: HOSPADM

## 2019-07-29 RX ORDER — LEVOTHYROXINE SODIUM 0.07 MG/1
75 TABLET ORAL DAILY
Status: DISCONTINUED | OUTPATIENT
Start: 2019-07-30 | End: 2019-07-31

## 2019-07-29 RX ORDER — LEVOFLOXACIN 5 MG/ML
500 INJECTION, SOLUTION INTRAVENOUS EVERY 24 HOURS
Status: DISCONTINUED | OUTPATIENT
Start: 2019-07-29 | End: 2019-07-31

## 2019-07-29 RX ORDER — OXYCODONE HYDROCHLORIDE AND ACETAMINOPHEN 5; 325 MG/1; MG/1
1 TABLET ORAL EVERY 6 HOURS PRN
Status: DISCONTINUED | OUTPATIENT
Start: 2019-07-29 | End: 2019-08-01 | Stop reason: ALTCHOICE

## 2019-07-29 RX ORDER — DEXTROSE MONOHYDRATE 25 G/50ML
12.5 INJECTION, SOLUTION INTRAVENOUS PRN
Status: DISCONTINUED | OUTPATIENT
Start: 2019-07-29 | End: 2019-08-06 | Stop reason: HOSPADM

## 2019-07-29 RX ORDER — SIMVASTATIN 40 MG
40 TABLET ORAL DAILY
Status: DISCONTINUED | OUTPATIENT
Start: 2019-07-30 | End: 2019-08-06 | Stop reason: HOSPADM

## 2019-07-29 RX ORDER — OMEPRAZOLE 20 MG/1
20 CAPSULE, DELAYED RELEASE ORAL DAILY
COMMUNITY

## 2019-07-29 RX ORDER — NICOTINE POLACRILEX 4 MG
15 LOZENGE BUCCAL PRN
Status: DISCONTINUED | OUTPATIENT
Start: 2019-07-29 | End: 2019-08-06 | Stop reason: HOSPADM

## 2019-07-29 RX ORDER — INSULIN GLARGINE 100 [IU]/ML
18 INJECTION, SOLUTION SUBCUTANEOUS NIGHTLY
Status: DISCONTINUED | OUTPATIENT
Start: 2019-07-29 | End: 2019-08-02

## 2019-07-29 RX ORDER — DEXTROSE MONOHYDRATE 50 MG/ML
100 INJECTION, SOLUTION INTRAVENOUS PRN
Status: DISCONTINUED | OUTPATIENT
Start: 2019-07-29 | End: 2019-08-06 | Stop reason: HOSPADM

## 2019-07-29 RX ADMIN — INSULIN LISPRO 2 UNITS: 100 INJECTION, SOLUTION INTRAVENOUS; SUBCUTANEOUS at 20:48

## 2019-07-29 RX ADMIN — IBUPROFEN 600 MG: 600 TABLET ORAL at 19:16

## 2019-07-29 RX ADMIN — LEVOFLOXACIN 500 MG: 5 INJECTION, SOLUTION INTRAVENOUS at 22:49

## 2019-07-29 RX ADMIN — Medication 1250 MG: at 20:48

## 2019-07-29 RX ADMIN — INSULIN GLARGINE 18 UNITS: 100 INJECTION, SOLUTION SUBCUTANEOUS at 20:49

## 2019-07-29 ASSESSMENT — PAIN DESCRIPTION - ORIENTATION: ORIENTATION: LEFT

## 2019-07-29 ASSESSMENT — PAIN DESCRIPTION - ONSET: ONSET: ON-GOING

## 2019-07-29 ASSESSMENT — PAIN - FUNCTIONAL ASSESSMENT: PAIN_FUNCTIONAL_ASSESSMENT: PREVENTS OR INTERFERES SOME ACTIVE ACTIVITIES AND ADLS

## 2019-07-29 ASSESSMENT — PAIN SCALES - GENERAL: PAINLEVEL_OUTOF10: 9

## 2019-07-29 ASSESSMENT — PAIN DESCRIPTION - DESCRIPTORS: DESCRIPTORS: ACHING;CONSTANT

## 2019-07-29 ASSESSMENT — PAIN DESCRIPTION - LOCATION: LOCATION: FOOT

## 2019-07-29 ASSESSMENT — PAIN DESCRIPTION - FREQUENCY: FREQUENCY: CONTINUOUS

## 2019-07-29 ASSESSMENT — PAIN DESCRIPTION - PROGRESSION: CLINICAL_PROGRESSION: GRADUALLY WORSENING

## 2019-07-29 ASSESSMENT — PAIN DESCRIPTION - PAIN TYPE: TYPE: ACUTE PAIN

## 2019-07-29 NOTE — PROGRESS NOTES
MRI checklist complete by patient and placed in chart.   Electronically signed by Jannette Adame RN on 7/29/2019 at 6:14 PM

## 2019-07-30 ENCOUNTER — APPOINTMENT (OUTPATIENT)
Dept: MRI IMAGING | Age: 59
DRG: 982 | End: 2019-07-30
Attending: INTERNAL MEDICINE
Payer: COMMERCIAL

## 2019-07-30 LAB
ALBUMIN SERPL-MCNC: 3.8 G/DL (ref 3.4–5)
ANION GAP SERPL CALCULATED.3IONS-SCNC: 11 MMOL/L (ref 3–16)
BASOPHILS ABSOLUTE: 0.1 K/UL (ref 0–0.2)
BASOPHILS RELATIVE PERCENT: 0.7 %
BUN BLDV-MCNC: 17 MG/DL (ref 7–20)
CALCIUM SERPL-MCNC: 9.5 MG/DL (ref 8.3–10.6)
CHLORIDE BLD-SCNC: 99 MMOL/L (ref 99–110)
CO2: 26 MMOL/L (ref 21–32)
CREAT SERPL-MCNC: 0.7 MG/DL (ref 0.6–1.1)
EOSINOPHILS ABSOLUTE: 0.2 K/UL (ref 0–0.6)
EOSINOPHILS RELATIVE PERCENT: 2.4 %
GFR AFRICAN AMERICAN: >60
GFR NON-AFRICAN AMERICAN: >60
GLUCOSE BLD-MCNC: 105 MG/DL (ref 70–99)
GLUCOSE BLD-MCNC: 173 MG/DL (ref 70–99)
GLUCOSE BLD-MCNC: 215 MG/DL (ref 70–99)
GLUCOSE BLD-MCNC: 264 MG/DL (ref 70–99)
GLUCOSE BLD-MCNC: 76 MG/DL (ref 70–99)
HCT VFR BLD CALC: 38.8 % (ref 36–48)
HEMOGLOBIN: 12.8 G/DL (ref 12–16)
LYMPHOCYTES ABSOLUTE: 2.2 K/UL (ref 1–5.1)
LYMPHOCYTES RELATIVE PERCENT: 27.6 %
MCH RBC QN AUTO: 28.2 PG (ref 26–34)
MCHC RBC AUTO-ENTMCNC: 33 G/DL (ref 31–36)
MCV RBC AUTO: 85.3 FL (ref 80–100)
MONOCYTES ABSOLUTE: 0.6 K/UL (ref 0–1.3)
MONOCYTES RELATIVE PERCENT: 7.7 %
NEUTROPHILS ABSOLUTE: 5 K/UL (ref 1.7–7.7)
NEUTROPHILS RELATIVE PERCENT: 61.6 %
PDW BLD-RTO: 14 % (ref 12.4–15.4)
PERFORMED ON: ABNORMAL
PERFORMED ON: NORMAL
PHOSPHORUS: 3.4 MG/DL (ref 2.5–4.9)
PLATELET # BLD: 315 K/UL (ref 135–450)
PMV BLD AUTO: 8.3 FL (ref 5–10.5)
POTASSIUM SERPL-SCNC: 4.5 MMOL/L (ref 3.5–5.1)
RBC # BLD: 4.54 M/UL (ref 4–5.2)
SODIUM BLD-SCNC: 136 MMOL/L (ref 136–145)
WBC # BLD: 8.1 K/UL (ref 4–11)

## 2019-07-30 PROCEDURE — 1200000000 HC SEMI PRIVATE

## 2019-07-30 PROCEDURE — 6360000002 HC RX W HCPCS: Performed by: INTERNAL MEDICINE

## 2019-07-30 PROCEDURE — 2580000003 HC RX 258: Performed by: INTERNAL MEDICINE

## 2019-07-30 PROCEDURE — 80069 RENAL FUNCTION PANEL: CPT

## 2019-07-30 PROCEDURE — 36415 COLL VENOUS BLD VENIPUNCTURE: CPT

## 2019-07-30 PROCEDURE — 6370000000 HC RX 637 (ALT 250 FOR IP): Performed by: INTERNAL MEDICINE

## 2019-07-30 PROCEDURE — 85025 COMPLETE CBC W/AUTO DIFF WBC: CPT

## 2019-07-30 PROCEDURE — 94760 N-INVAS EAR/PLS OXIMETRY 1: CPT

## 2019-07-30 PROCEDURE — 73718 MRI LOWER EXTREMITY W/O DYE: CPT

## 2019-07-30 RX ADMIN — INSULIN LISPRO 10 UNITS: 100 INJECTION, SOLUTION INTRAVENOUS; SUBCUTANEOUS at 17:56

## 2019-07-30 RX ADMIN — INSULIN LISPRO 2 UNITS: 100 INJECTION, SOLUTION INTRAVENOUS; SUBCUTANEOUS at 12:32

## 2019-07-30 RX ADMIN — INSULIN LISPRO 10 UNITS: 100 INJECTION, SOLUTION INTRAVENOUS; SUBCUTANEOUS at 08:57

## 2019-07-30 RX ADMIN — OXYCODONE HYDROCHLORIDE AND ACETAMINOPHEN 1 TABLET: 5; 325 TABLET ORAL at 12:32

## 2019-07-30 RX ADMIN — IBUPROFEN 600 MG: 600 TABLET ORAL at 10:50

## 2019-07-30 RX ADMIN — ASPIRIN 81 MG: 81 TABLET ORAL at 08:58

## 2019-07-30 RX ADMIN — LEVOFLOXACIN 500 MG: 5 INJECTION, SOLUTION INTRAVENOUS at 23:08

## 2019-07-30 RX ADMIN — VANCOMYCIN HYDROCHLORIDE 1000 MG: 1 INJECTION, POWDER, LYOPHILIZED, FOR SOLUTION INTRAVENOUS at 22:05

## 2019-07-30 RX ADMIN — ENOXAPARIN SODIUM 40 MG: 40 INJECTION SUBCUTANEOUS at 08:50

## 2019-07-30 RX ADMIN — INSULIN GLARGINE 18 UNITS: 100 INJECTION, SOLUTION SUBCUTANEOUS at 22:05

## 2019-07-30 RX ADMIN — INSULIN LISPRO 6 UNITS: 100 INJECTION, SOLUTION INTRAVENOUS; SUBCUTANEOUS at 08:51

## 2019-07-30 RX ADMIN — VANCOMYCIN HYDROCHLORIDE 1000 MG: 1 INJECTION, POWDER, LYOPHILIZED, FOR SOLUTION INTRAVENOUS at 08:58

## 2019-07-30 RX ADMIN — IBUPROFEN 600 MG: 600 TABLET ORAL at 17:56

## 2019-07-30 RX ADMIN — OXYCODONE HYDROCHLORIDE AND ACETAMINOPHEN 1 TABLET: 5; 325 TABLET ORAL at 02:21

## 2019-07-30 RX ADMIN — INSULIN LISPRO 10 UNITS: 100 INJECTION, SOLUTION INTRAVENOUS; SUBCUTANEOUS at 12:32

## 2019-07-30 RX ADMIN — LEVOTHYROXINE SODIUM 75 MCG: 75 TABLET ORAL at 05:48

## 2019-07-30 RX ADMIN — SIMVASTATIN 40 MG: 40 TABLET, FILM COATED ORAL at 08:58

## 2019-07-30 ASSESSMENT — PAIN DESCRIPTION - DESCRIPTORS
DESCRIPTORS: ACHING;BURNING
DESCRIPTORS: ACHING;BURNING
DESCRIPTORS: ACHING;CONSTANT

## 2019-07-30 ASSESSMENT — PAIN DESCRIPTION - ORIENTATION
ORIENTATION: LEFT

## 2019-07-30 ASSESSMENT — PAIN DESCRIPTION - PROGRESSION
CLINICAL_PROGRESSION: NOT CHANGED

## 2019-07-30 ASSESSMENT — PAIN DESCRIPTION - PAIN TYPE
TYPE: ACUTE PAIN

## 2019-07-30 ASSESSMENT — PAIN DESCRIPTION - LOCATION
LOCATION: FOOT

## 2019-07-30 ASSESSMENT — PAIN DESCRIPTION - ONSET
ONSET: ON-GOING

## 2019-07-30 ASSESSMENT — PAIN SCALES - GENERAL
PAINLEVEL_OUTOF10: 4
PAINLEVEL_OUTOF10: 5
PAINLEVEL_OUTOF10: 6
PAINLEVEL_OUTOF10: 8
PAINLEVEL_OUTOF10: 0
PAINLEVEL_OUTOF10: 6
PAINLEVEL_OUTOF10: 3
PAINLEVEL_OUTOF10: 7

## 2019-07-30 ASSESSMENT — PAIN DESCRIPTION - FREQUENCY
FREQUENCY: CONTINUOUS

## 2019-07-30 ASSESSMENT — PAIN - FUNCTIONAL ASSESSMENT
PAIN_FUNCTIONAL_ASSESSMENT: PREVENTS OR INTERFERES SOME ACTIVE ACTIVITIES AND ADLS
PAIN_FUNCTIONAL_ASSESSMENT: PREVENTS OR INTERFERES WITH MANY ACTIVE NOT PASSIVE ACTIVITIES
PAIN_FUNCTIONAL_ASSESSMENT: PREVENTS OR INTERFERES WITH MANY ACTIVE NOT PASSIVE ACTIVITIES
PAIN_FUNCTIONAL_ASSESSMENT: PREVENTS OR INTERFERES SOME ACTIVE ACTIVITIES AND ADLS
PAIN_FUNCTIONAL_ASSESSMENT: PREVENTS OR INTERFERES SOME ACTIVE ACTIVITIES AND ADLS
PAIN_FUNCTIONAL_ASSESSMENT: PREVENTS OR INTERFERES WITH MANY ACTIVE NOT PASSIVE ACTIVITIES
PAIN_FUNCTIONAL_ASSESSMENT: PREVENTS OR INTERFERES SOME ACTIVE ACTIVITIES AND ADLS

## 2019-07-30 NOTE — PLAN OF CARE
Problem: Falls - Risk of:  Goal: Will remain free from falls  Description  Will remain free from falls  7/30/2019 0746 by Hernandez Felton RN  Outcome: Ongoing  7/29/2019 2351 by Peter Herrmann RN  Outcome: Ongoing  Note:   Fall risk assessment completed . Fall precautions in place, bed/ chair alarm on, side rails 2/4 up, call light in reach, educated pt on calling for assistance when needed, room clear of clutter. Pt verbalized understanding. 7/29/2019 2351 by Peter Herrmann RN  Outcome: Ongoing  Goal: Absence of physical injury  Description  Absence of physical injury  7/30/2019 0746 by Hernandez Felton RN  Outcome: Ongoing  7/29/2019 2351 by Peter Herrmann RN  Outcome: Ongoing  Note:   Pt is free of injury. No injury noted. Fall precautions in place. Call light within reach. Will monitor. 7/29/2019 2351 by Peter Herrmann RN  Outcome: Ongoing     Problem: Pain:  Goal: Pain level will decrease  Description  Pain level will decrease  7/30/2019 0746 by Hernandez Felton RN  Outcome: Ongoing  7/29/2019 2351 by Peter Herrmann RN  Outcome: Ongoing  Note:   Pain/discomfort being managed with PRN analgesics per MD orders. Pt able to express presence and absence of pain and rate pain appropriately using numerical scale. 7/29/2019 2351 by Peter Herrmann RN  Outcome: Ongoing  Goal: Control of acute pain  Description  Control of acute pain  7/30/2019 0746 by Hernandez Felton RN  Outcome: Ongoing  7/29/2019 2351 by Peter Herrmann RN  Outcome: Ongoing  Note:   Patient educated on acute pain. Taught patient to use call light to ask for pain medication. PRN pain medication given for acute pain. Will continue to monitor pain per unit protocol.     7/29/2019 2351 by Peter Herrmann RN  Outcome: Ongoing  Goal: Control of chronic pain  Description  Control of chronic pain  7/30/2019 0746 by Hernandez Felton RN  Outcome: Ongoing  7/29/2019 2351 by Peter Herrmann RN  Outcome:

## 2019-07-30 NOTE — PROGRESS NOTES
Consult noted. Will see patient today.     Hiro Leos. [facility] [address]  [phone]

## 2019-07-30 NOTE — PROGRESS NOTES
Hospitalist Progress Note      PCP: Cindi Bucio MD    Date of Admission: 7/29/2019    Chief Complaint: left foot pain. Admitted form podiatry office      Subjective: Feels pain is controlled better. Erythema improved. Still having problems with flexion of 3-5 toes. Medications:  Reviewed    Infusion Medications    dextrose       Scheduled Medications    insulin lispro  10 Units Subcutaneous TID     aspirin  81 mg Oral Daily    insulin glargine  18 Units Subcutaneous Nightly    levothyroxine  75 mcg Oral Daily    simvastatin  40 mg Oral Daily    levofloxacin  500 mg Intravenous Q24H    insulin lispro  0-12 Units Subcutaneous TID     insulin lispro  0-6 Units Subcutaneous Nightly    enoxaparin  40 mg Subcutaneous Daily    vancomycin (VANCOCIN) intermittent dosing (placeholder)   Other RX Placeholder    vancomycin  1,000 mg Intravenous Q12H     PRN Meds: ibuprofen, oxyCODONE-acetaminophen, glucose, dextrose, glucagon (rDNA), dextrose      Intake/Output Summary (Last 24 hours) at 7/30/2019 1805  Last data filed at 7/30/2019 1320  Gross per 24 hour   Intake 1310 ml   Output --   Net 1310 ml       Physical Exam Performed:    BP (!) 164/83   Pulse 64   Temp 97.4 °F (36.3 °C) (Oral)   Resp 16   Ht 5' 5\" (1.651 m)   Wt 187 lb (84.8 kg)   LMP  (LMP Unknown)   SpO2 98%   BMI 31.12 kg/m²     General appearance: No apparent distress appears stated age and cooperative. HEENT Normal cephalic, atraumatic without obvious deformity. Pupils equal, round, and reactive to light. Extra ocular muscles intact. Conjunctivae/corneas clear. Neck: Supple, No jugular venous distention/bruits. Trachea midline without thyromegaly or adenopathy with full range of motion. Lungs: Clear to auscultation, bilaterally without Rales/Wheezes/Rhonchi with good respiratory effort.   Heart: Regular rate and rhythm with Normal S1/S2 without murmurs, rubs or gallops, point of maximum impulse non-displaced  Abdomen: Soft, non-tender or non-distended without rigidity or guarding and positive bowel sounds all four quadrants. Extremities: overall very good condition of skin and great pulses. Left foot lateral plantar surface shallow open ulceration with indurated area underneath, associated erythema, and edema spreading to the dorsum of the foot and towards the ankle. Neurologic: Alert and oriented X 3, neurovascularly intact with sensory/motor intact upper extremities/lower extremities, bilaterally. Cranial nerves: II-XII intact, grossly non-focal.  Mental status: Alert, oriented, thought content appropriate. Capillary Refill: Acceptable  < 3 seconds  Peripheral Pulses: +3 Easily felt, not easily obliterated with pressure         Labs:   Recent Labs     07/29/19 1922 07/30/19  0549   WBC 11.6* 8.1   HGB 12.2 12.8   HCT 37.5 38.8    315     Recent Labs     07/29/19 1922 07/30/19  0549    136   K 5.1 4.5    99   CO2 26 26   BUN 21* 17   CREATININE 0.9 0.7   CALCIUM 9.8 9.5   PHOS 3.3 3.4     Recent Labs     07/29/19 1922   AST 14*   ALT 10   BILIDIR <0.2   BILITOT <0.2   ALKPHOS 66     Recent Labs     07/29/19 1922   INR 1.08     No results for input(s): Jimenez Broody in the last 72 hours. Urinalysis:      Lab Results   Component Value Date    NITRU Negative 03/29/2019    WBCUA 0-2 03/29/2019    BACTERIA Rare 03/29/2019    RBCUA 0-2 03/29/2019    BLOODU Negative 03/29/2019    SPECGRAV 1.015 03/29/2019    GLUCOSEU Negative 03/29/2019       Radiology:  MRI FOOT LEFT WO CONTRAST   Final Result   Soft tissue swelling and edema most prominent dorsal aspect of the foot. No   osteomyelitis. Assessment/Plan:    Active Hospital Problems    Diagnosis    Cellulitis of left foot [L03.116]       Cellulitis with suspected myositis and possible tendonitis  In setting of exposure to [place] - possible virbio.  Pseudomonas remains on the differential for this pt with long standing

## 2019-07-30 NOTE — CONSULTS
release capsule Take 20 mg by mouth Daily   Yes Historical Provider, MD   clindamycin (CLEOCIN) 150 MG capsule Take 3 capsules by mouth 3 times daily for 10 days 7/26/19 8/5/19 Yes LEO Mckeon CNP   ibuprofen (ADVIL;MOTRIN) 800 MG tablet Take 1 tablet by mouth every 8 hours as needed for Pain 7/26/19  Yes LEO Mckeon CNP   insulin lispro (HUMALOG) 100 UNIT/ML injection vial 6-10 units AC TID  Patient taking differently: Inject 8 Units into the skin 3 times daily (before meals) 6-10 units AC TID 6/28/19  Yes Jeanna Rucker MD   POTASSIUM CHLORIDE PO Take 99 mg by mouth daily    Yes Historical Provider, MD   insulin glargine (LANTUS SOLOSTAR) 100 UNIT/ML injection pen 18 units daily  Patient taking differently: Inject 24 Units into the skin nightly 18 units daily 6/27/19  Yes Jeanna Rucker MD   Insulin Pen Needle (B-D UF III MINI PEN NEEDLES) 31G X 5 MM MISC 4 times a day 6/27/19  Yes Jeanna Rucker MD   simvastatin (ZOCOR) 40 MG tablet TAKE 1 TABLET DAILY 1/2/19  Yes Tj Hunt, DO   FREESTYLE LITE strip TEST GLUCOSE TWICE A DAY 8/6/18  Yes Tj Hunt, DO   levothyroxine (SYNTHROID) 50 MCG tablet TAKE 1 TABLET ON MONDAY, [facility] AND FRIDAY 2/2/18  Yes Tj Maharaju, DO   levothyroxine (SYNTHROID) 75 MCG tablet TAKE 1 TABLET ON Trinda Kenney, [address] [place] [address] AND SUNDAY 2/2/18  Yes Tj Hunt, DO   aspirin 81 MG EC tablet Take 1 by mouth every other day   Yes Historical Provider, MD     Allergies:   Zithromax [azithromycin]  Social History:    Patient denies smoking.  Patient denies drinking ETOH    Review of Systems:  A full review of systems was performed and is negative except for that which appears in the HPI    LABS:   Recent Labs     07/29/19  1922 07/30/19  0549   WBC 11.6* 8.1   HGB 12.2 12.8   HCT 37.5 38.8    315     Recent Labs     07/30/19  0549      K 4.5   CL 99   CO2 26   PHOS 3.4   BUN 17   CREATININE 0.7     Recent Labs     07/29/19 1922

## 2019-07-31 ENCOUNTER — ANESTHESIA EVENT (OUTPATIENT)
Dept: OPERATING ROOM | Age: 59
DRG: 982 | End: 2019-07-31
Payer: COMMERCIAL

## 2019-07-31 LAB
ABO/RH: NORMAL
ALBUMIN SERPL-MCNC: 3.7 G/DL (ref 3.4–5)
ANION GAP SERPL CALCULATED.3IONS-SCNC: 11 MMOL/L (ref 3–16)
ANTIBODY SCREEN: NORMAL
BASOPHILS ABSOLUTE: 0.1 K/UL (ref 0–0.2)
BASOPHILS RELATIVE PERCENT: 0.8 %
BUN BLDV-MCNC: 14 MG/DL (ref 7–20)
CALCIUM SERPL-MCNC: 9.5 MG/DL (ref 8.3–10.6)
CHLORIDE BLD-SCNC: 98 MMOL/L (ref 99–110)
CO2: 27 MMOL/L (ref 21–32)
CREAT SERPL-MCNC: 0.7 MG/DL (ref 0.6–1.1)
EOSINOPHILS ABSOLUTE: 0.2 K/UL (ref 0–0.6)
EOSINOPHILS RELATIVE PERCENT: 2.9 %
GFR AFRICAN AMERICAN: >60
GFR NON-AFRICAN AMERICAN: >60
GLUCOSE BLD-MCNC: 120 MG/DL (ref 70–99)
GLUCOSE BLD-MCNC: 194 MG/DL (ref 70–99)
GLUCOSE BLD-MCNC: 201 MG/DL (ref 70–99)
GLUCOSE BLD-MCNC: 259 MG/DL (ref 70–99)
GLUCOSE BLD-MCNC: 265 MG/DL (ref 70–99)
GLUCOSE BLD-MCNC: 286 MG/DL (ref 70–99)
GLUCOSE BLD-MCNC: 56 MG/DL (ref 70–99)
GLUCOSE BLD-MCNC: 66 MG/DL (ref 70–99)
HCT VFR BLD CALC: 36.4 % (ref 36–48)
HEMOGLOBIN: 12.2 G/DL (ref 12–16)
LYMPHOCYTES ABSOLUTE: 2 K/UL (ref 1–5.1)
LYMPHOCYTES RELATIVE PERCENT: 30.1 %
MCH RBC QN AUTO: 28.5 PG (ref 26–34)
MCHC RBC AUTO-ENTMCNC: 33.5 G/DL (ref 31–36)
MCV RBC AUTO: 84.9 FL (ref 80–100)
MONOCYTES ABSOLUTE: 0.6 K/UL (ref 0–1.3)
MONOCYTES RELATIVE PERCENT: 9.1 %
NEUTROPHILS ABSOLUTE: 3.7 K/UL (ref 1.7–7.7)
NEUTROPHILS RELATIVE PERCENT: 57.1 %
PDW BLD-RTO: 14.1 % (ref 12.4–15.4)
PERFORMED ON: ABNORMAL
PHOSPHORUS: 3.1 MG/DL (ref 2.5–4.9)
PLATELET # BLD: 289 K/UL (ref 135–450)
PMV BLD AUTO: 8.1 FL (ref 5–10.5)
POTASSIUM SERPL-SCNC: 4.8 MMOL/L (ref 3.5–5.1)
RBC # BLD: 4.29 M/UL (ref 4–5.2)
SODIUM BLD-SCNC: 136 MMOL/L (ref 136–145)
VANCOMYCIN TROUGH: 11.5 UG/ML (ref 10–20)
WBC # BLD: 6.5 K/UL (ref 4–11)

## 2019-07-31 PROCEDURE — 6360000002 HC RX W HCPCS: Performed by: INTERNAL MEDICINE

## 2019-07-31 PROCEDURE — 2580000003 HC RX 258: Performed by: INTERNAL MEDICINE

## 2019-07-31 PROCEDURE — 80202 ASSAY OF VANCOMYCIN: CPT

## 2019-07-31 PROCEDURE — 85025 COMPLETE CBC W/AUTO DIFF WBC: CPT

## 2019-07-31 PROCEDURE — 86900 BLOOD TYPING SEROLOGIC ABO: CPT

## 2019-07-31 PROCEDURE — 36415 COLL VENOUS BLD VENIPUNCTURE: CPT

## 2019-07-31 PROCEDURE — 6370000000 HC RX 637 (ALT 250 FOR IP): Performed by: INTERNAL MEDICINE

## 2019-07-31 PROCEDURE — 94760 N-INVAS EAR/PLS OXIMETRY 1: CPT

## 2019-07-31 PROCEDURE — 86850 RBC ANTIBODY SCREEN: CPT

## 2019-07-31 PROCEDURE — 86901 BLOOD TYPING SEROLOGIC RH(D): CPT

## 2019-07-31 PROCEDURE — 99255 IP/OBS CONSLTJ NEW/EST HI 80: CPT | Performed by: INTERNAL MEDICINE

## 2019-07-31 PROCEDURE — 80069 RENAL FUNCTION PANEL: CPT

## 2019-07-31 PROCEDURE — 1200000000 HC SEMI PRIVATE

## 2019-07-31 RX ORDER — LEVOTHYROXINE SODIUM 0.07 MG/1
75 TABLET ORAL
Status: DISCONTINUED | OUTPATIENT
Start: 2019-08-01 | End: 2019-08-06 | Stop reason: HOSPADM

## 2019-07-31 RX ORDER — LEVOTHYROXINE SODIUM 0.05 MG/1
50 TABLET ORAL
Status: DISCONTINUED | OUTPATIENT
Start: 2019-08-02 | End: 2019-08-06 | Stop reason: HOSPADM

## 2019-07-31 RX ADMIN — SIMVASTATIN 40 MG: 40 TABLET, FILM COATED ORAL at 08:17

## 2019-07-31 RX ADMIN — LEVOTHYROXINE SODIUM 75 MCG: 75 TABLET ORAL at 06:39

## 2019-07-31 RX ADMIN — ENOXAPARIN SODIUM 40 MG: 40 INJECTION SUBCUTANEOUS at 08:17

## 2019-07-31 RX ADMIN — INSULIN LISPRO 3 UNITS: 100 INJECTION, SOLUTION INTRAVENOUS; SUBCUTANEOUS at 21:35

## 2019-07-31 RX ADMIN — INSULIN LISPRO 4 UNITS: 100 INJECTION, SOLUTION INTRAVENOUS; SUBCUTANEOUS at 12:04

## 2019-07-31 RX ADMIN — IBUPROFEN 600 MG: 600 TABLET ORAL at 06:39

## 2019-07-31 RX ADMIN — VANCOMYCIN HYDROCHLORIDE 1000 MG: 1 INJECTION, POWDER, LYOPHILIZED, FOR SOLUTION INTRAVENOUS at 10:04

## 2019-07-31 RX ADMIN — IBUPROFEN 600 MG: 600 TABLET ORAL at 21:33

## 2019-07-31 RX ADMIN — INSULIN GLARGINE 18 UNITS: 100 INJECTION, SOLUTION SUBCUTANEOUS at 21:35

## 2019-07-31 RX ADMIN — DEXTROSE 15 G: 15 GEL ORAL at 16:33

## 2019-07-31 RX ADMIN — IBUPROFEN 600 MG: 600 TABLET ORAL at 14:41

## 2019-07-31 RX ADMIN — VANCOMYCIN HYDROCHLORIDE 1000 MG: 1 INJECTION, POWDER, LYOPHILIZED, FOR SOLUTION INTRAVENOUS at 21:34

## 2019-07-31 RX ADMIN — INSULIN LISPRO 6 UNITS: 100 INJECTION, SOLUTION INTRAVENOUS; SUBCUTANEOUS at 08:17

## 2019-07-31 RX ADMIN — INSULIN LISPRO 10 UNITS: 100 INJECTION, SOLUTION INTRAVENOUS; SUBCUTANEOUS at 12:05

## 2019-07-31 RX ADMIN — ASPIRIN 81 MG: 81 TABLET ORAL at 08:17

## 2019-07-31 RX ADMIN — INSULIN LISPRO 10 UNITS: 100 INJECTION, SOLUTION INTRAVENOUS; SUBCUTANEOUS at 08:19

## 2019-07-31 ASSESSMENT — PAIN DESCRIPTION - LOCATION
LOCATION: FOOT

## 2019-07-31 ASSESSMENT — PAIN DESCRIPTION - PROGRESSION
CLINICAL_PROGRESSION: NOT CHANGED
CLINICAL_PROGRESSION: GRADUALLY IMPROVING
CLINICAL_PROGRESSION: GRADUALLY IMPROVING
CLINICAL_PROGRESSION: GRADUALLY WORSENING

## 2019-07-31 ASSESSMENT — PAIN SCALES - GENERAL
PAINLEVEL_OUTOF10: 4
PAINLEVEL_OUTOF10: 5
PAINLEVEL_OUTOF10: 3
PAINLEVEL_OUTOF10: 5
PAINLEVEL_OUTOF10: 5

## 2019-07-31 ASSESSMENT — PAIN DESCRIPTION - ONSET
ONSET: ON-GOING

## 2019-07-31 ASSESSMENT — PAIN - FUNCTIONAL ASSESSMENT: PAIN_FUNCTIONAL_ASSESSMENT: PREVENTS OR INTERFERES SOME ACTIVE ACTIVITIES AND ADLS

## 2019-07-31 ASSESSMENT — PAIN DESCRIPTION - FREQUENCY
FREQUENCY: CONTINUOUS

## 2019-07-31 ASSESSMENT — PAIN DESCRIPTION - ORIENTATION
ORIENTATION: LEFT

## 2019-07-31 ASSESSMENT — PAIN DESCRIPTION - DESCRIPTORS
DESCRIPTORS: ACHING
DESCRIPTORS: ACHING;DISCOMFORT

## 2019-07-31 ASSESSMENT — PAIN DESCRIPTION - PAIN TYPE
TYPE: ACUTE PAIN

## 2019-07-31 NOTE — PROGRESS NOTES
Patient is resting in chair, awake and quiet. Room air. Patient is UAL. Call light is in reach. Patient denies needs at this time. Will continue to monitor patient per unit protocols.  Electronically signed by Jm Manley RN on 7/31/2019 at 5:21 PM

## 2019-07-31 NOTE — PROGRESS NOTES
point of maximum impulse non-displaced  Abdomen: Soft, non-tender or non-distended without rigidity or guarding and positive bowel sounds all four quadrants. Extremities: overall very good condition of skin and great pulses. Left foot lateral plantar surface shallow open ulceration with indurated area underneath, associated erythema, and edema spreading to the dorsum of the foot and towards the ankle. Neurologic: Alert and oriented X 3, neurovascularly intact with sensory/motor intact upper extremities/lower extremities, bilaterally. Cranial nerves: II-XII intact, grossly non-focal.  Mental status: Alert, oriented, thought content appropriate. Capillary Refill: Acceptable  < 3 seconds  Peripheral Pulses: +3 Easily felt, not easily obliterated with pressure         Labs:   Recent Labs     07/29/19 1922 07/30/19  0549 07/31/19  0612   WBC 11.6* 8.1 6.5   HGB 12.2 12.8 12.2   HCT 37.5 38.8 36.4    315 289     Recent Labs     07/29/19 1922 07/30/19  0549 07/31/19  0612    136 136   K 5.1 4.5 4.8    99 98*   CO2 26 26 27   BUN 21* 17 14   CREATININE 0.9 0.7 0.7   CALCIUM 9.8 9.5 9.5   PHOS 3.3 3.4 3.1     Recent Labs     07/29/19 1922   AST 14*   ALT 10   BILIDIR <0.2   BILITOT <0.2   ALKPHOS 66     Recent Labs     07/29/19 1922   INR 1.08     No results for input(s): Orvilleterri Rothmanrica in the last 72 hours. Urinalysis:      Lab Results   Component Value Date    NITRU Negative 03/29/2019    WBCUA 0-2 03/29/2019    BACTERIA Rare 03/29/2019    RBCUA 0-2 03/29/2019    BLOODU Negative 03/29/2019    SPECGRAV 1.015 03/29/2019    GLUCOSEU Negative 03/29/2019       Radiology:  MRI FOOT LEFT WO CONTRAST   Final Result   Addendum 1 of 1   ADDENDUM:   Additional history of the patient is that she recently had an incision    along   the plantar aspect of her foot and is now having some pus drainage.   There    is   a small focal defect in the skin plantar aspect between the 3rd and 4th   metatarsals at

## 2019-07-31 NOTE — PROGRESS NOTES
forefoot diffusely. Muscle strength is 5/5 to all muscle groups and symmetrical bilaterally. Range of motion is reduced to the ankle joint, STJ and 1st MPJ, bilaterally. Pain with passive ROM of lesser digits 3 and 4 left. IMAGING:  Imaging:  Narrative   EXAMINATION:   MRI OF THE LEFT FOOT WITHOUT CONTRAST, 7/30/2019 11:01 am       TECHNIQUE:   Multiplanar multisequence MRI of the left foot was performed without the   administration of intravenous contrast.       COMPARISON:   None       HISTORY:   ORDERING SYSTEM PROVIDED HISTORY: cellulitis, eval for deep soft tissue/bone   infection. TECHNOLOGIST PROVIDED HISTORY:   Reason for Exam: cellulitis, eval for deep soft tissue/bone infection. Acuity: Acute   Type of Exam: Initial   Additional signs and symptoms: LT foot swelling under toes with sensitivity   to top and bottom of foot.       FINDINGS:   No acute fracture or osteonecrosis.  No evidence of osteomyelitis.       Diffuse soft tissue swelling and edema most prominent dorsal aspect of the   foot.  No fluid collection. .       Intraosseous cyst versus erosion medial 1st metatarsal head.  Small marginal   osteophytes at the 1st MTP joint.  No significant joint effusion.       Flexor and extensor tendons are intact.           Impression   Soft tissue swelling and edema most prominent dorsal aspect of the foot.  No   osteomyelitis. ADDENDUM:  Additional history of the patient is that she recently had an incision along  the plantar aspect of her foot and is now having some pus drainage. There is  a small focal defect in the skin plantar aspect between the 3rd and 4th  metatarsals at the level of the MTP joints. There is an approximately 7 x 6  mm focus within the underlying soft tissues which is slightly hyperintense  compared with the remaining tissue but it is not fluid intensity. Findings  may either represent phlegmonous change or focal edema.   Findings were  discussed with Dr. Eleanor Major

## 2019-07-31 NOTE — CONSULTS
 insulin lispro  0-12 Units Subcutaneous TID WC    insulin lispro  0-6 Units Subcutaneous Nightly    vancomycin (VANCOCIN) intermittent dosing (placeholder)   Other RX Placeholder    vancomycin  1,000 mg Intravenous Q12H       Continuous Infusions:   dextrose         PRN Meds:  ibuprofen, oxyCODONE-acetaminophen, glucose, dextrose, glucagon (rDNA), dextrose    MICRO: cultures reviewed and updated by me   07/29/19 1832       Order Status: Completed Specimen: Foot Updated: 07/31/19 0903    WOUND/ABSCESS --    Gram Stain Result No WBCs or organisms seen   No Epithelial Cells seen     Organism Staph aureus MRSAAbnormal     WOUND/ABSCESS --Abnormal     Light growth   PBP2= POSITIVE   Sensitivity to follow   CONTACT PRECAUTIONS INDICATED   Abnormal    Narrative:     ORDER#: [id number]                          ORDERED BY: Cristian Lopez  SOURCE: Foot                               COLLECTED:  07/29/19 18:32  ANT[facility]OTICS AT [facility]. :                      RECEIVED :  07/29/19 18:36  CALL  [facility]  [facility] tel. [phone],  Microbiology results called to and read back by Severo Johnson RN, 07/31/2019  09:03, by Randi  Performed at:  [facility]  [address]   Phone [phone]   Culture Blood #1 [[id number]] Collected: 07/29/19 1922   Order Status: Completed Specimen: Blood Updated: 07/30/19 2215    Blood Culture, Routine No Growth to date.  Any change in status will be called. Narrative:     ORDER#: [id number]                          ORDERED BY: Cristian Lopez  SOURCE: Blood Antecubital-Right            COLLECTED:  07/29/19 19:22  ANT[facility]OTICS AT [facility] :                      RECEIVED :  07/29/19 20:29  If child <=2 yrs old please draw pediatric bottle. ~Blood Culture #1  Performed at:  [facility]  [address]   Phone [phone]   Culture Blood #2 [[id number]] Collected: 07/29/19 Sangeeta Lockett Cont IV Vancomycin x 1 gm Q 12 HRS  2. Send OR tissue for cx tomorrow  3. Cont local care  4. Depending on OR findings will see if she needs IV vs oral abx   5. OPAT d/w pt   6.last HbA1C @8.2  7.d/c Levofloxacin     Discussed with patient/Family  D/w RN     Thanks for allowing me to participate in your patient's care please call me with any questions or concerns.     Dr. Albaro Thornton MD  [address] [place] [address] Physician  Phone: [phone]   Fax : [phone]

## 2019-07-31 NOTE — PROGRESS NOTES
Patient denies needs at this time. AM meds given and shift assessment complete. Will continue to monitor patient per unit protocols.  Electronically signed by Donta Ferrara RN on 7/31/2019 at 9:41 AM

## 2019-08-01 ENCOUNTER — ANESTHESIA (OUTPATIENT)
Dept: OPERATING ROOM | Age: 59
DRG: 982 | End: 2019-08-01
Payer: COMMERCIAL

## 2019-08-01 VITALS
OXYGEN SATURATION: 92 % | SYSTOLIC BLOOD PRESSURE: 94 MMHG | DIASTOLIC BLOOD PRESSURE: 52 MMHG | RESPIRATION RATE: 16 BRPM

## 2019-08-01 LAB
ALBUMIN SERPL-MCNC: 3.8 G/DL (ref 3.4–5)
ANION GAP SERPL CALCULATED.3IONS-SCNC: 11 MMOL/L (ref 3–16)
BASOPHILS ABSOLUTE: 0.1 K/UL (ref 0–0.2)
BASOPHILS RELATIVE PERCENT: 0.9 %
BUN BLDV-MCNC: 14 MG/DL (ref 7–20)
CALCIUM SERPL-MCNC: 9.5 MG/DL (ref 8.3–10.6)
CHLORIDE BLD-SCNC: 100 MMOL/L (ref 99–110)
CO2: 27 MMOL/L (ref 21–32)
CREAT SERPL-MCNC: 0.8 MG/DL (ref 0.6–1.1)
EOSINOPHILS ABSOLUTE: 0.2 K/UL (ref 0–0.6)
EOSINOPHILS RELATIVE PERCENT: 3.7 %
GFR AFRICAN AMERICAN: >60
GFR NON-AFRICAN AMERICAN: >60
GLUCOSE BLD-MCNC: 203 MG/DL (ref 70–99)
GLUCOSE BLD-MCNC: 261 MG/DL (ref 70–99)
GLUCOSE BLD-MCNC: 265 MG/DL (ref 70–99)
GLUCOSE BLD-MCNC: 268 MG/DL (ref 70–99)
GLUCOSE BLD-MCNC: 301 MG/DL (ref 70–99)
GLUCOSE BLD-MCNC: 312 MG/DL (ref 70–99)
GRAM STAIN RESULT: ABNORMAL
HCT VFR BLD CALC: 38.5 % (ref 36–48)
HEMOGLOBIN: 12.9 G/DL (ref 12–16)
LYMPHOCYTES ABSOLUTE: 1.9 K/UL (ref 1–5.1)
LYMPHOCYTES RELATIVE PERCENT: 29.7 %
MCH RBC QN AUTO: 28.4 PG (ref 26–34)
MCHC RBC AUTO-ENTMCNC: 33.5 G/DL (ref 31–36)
MCV RBC AUTO: 84.8 FL (ref 80–100)
MONOCYTES ABSOLUTE: 0.6 K/UL (ref 0–1.3)
MONOCYTES RELATIVE PERCENT: 8.7 %
NEUTROPHILS ABSOLUTE: 3.7 K/UL (ref 1.7–7.7)
NEUTROPHILS RELATIVE PERCENT: 57 %
ORGANISM: ABNORMAL
PDW BLD-RTO: 14 % (ref 12.4–15.4)
PERFORMED ON: ABNORMAL
PHOSPHORUS: 3.7 MG/DL (ref 2.5–4.9)
PLATELET # BLD: 302 K/UL (ref 135–450)
PMV BLD AUTO: 8.1 FL (ref 5–10.5)
POTASSIUM SERPL-SCNC: 4.3 MMOL/L (ref 3.5–5.1)
RBC # BLD: 4.55 M/UL (ref 4–5.2)
SODIUM BLD-SCNC: 138 MMOL/L (ref 136–145)
WBC # BLD: 6.4 K/UL (ref 4–11)
WOUND/ABSCESS: ABNORMAL
WOUND/ABSCESS: ABNORMAL

## 2019-08-01 PROCEDURE — 2580000003 HC RX 258: Performed by: ANESTHESIOLOGY

## 2019-08-01 PROCEDURE — 6360000002 HC RX W HCPCS: Performed by: INTERNAL MEDICINE

## 2019-08-01 PROCEDURE — 99233 SBSQ HOSP IP/OBS HIGH 50: CPT | Performed by: INTERNAL MEDICINE

## 2019-08-01 PROCEDURE — 87205 SMEAR GRAM STAIN: CPT

## 2019-08-01 PROCEDURE — 87176 TISSUE HOMOGENIZATION CULTR: CPT

## 2019-08-01 PROCEDURE — 1200000000 HC SEMI PRIVATE

## 2019-08-01 PROCEDURE — 6370000000 HC RX 637 (ALT 250 FOR IP): Performed by: PODIATRIST

## 2019-08-01 PROCEDURE — 2709999900 HC NON-CHARGEABLE SUPPLY: Performed by: PODIATRIST

## 2019-08-01 PROCEDURE — 6360000002 HC RX W HCPCS: Performed by: PODIATRIST

## 2019-08-01 PROCEDURE — 2500000003 HC RX 250 WO HCPCS: Performed by: PODIATRIST

## 2019-08-01 PROCEDURE — 87075 CULTR BACTERIA EXCEPT BLOOD: CPT

## 2019-08-01 PROCEDURE — 3700000000 HC ANESTHESIA ATTENDED CARE: Performed by: PODIATRIST

## 2019-08-01 PROCEDURE — 80069 RENAL FUNCTION PANEL: CPT

## 2019-08-01 PROCEDURE — 2580000003 HC RX 258: Performed by: PODIATRIST

## 2019-08-01 PROCEDURE — 7100000000 HC PACU RECOVERY - FIRST 15 MIN: Performed by: PODIATRIST

## 2019-08-01 PROCEDURE — 0KBW0ZZ EXCISION OF LEFT FOOT MUSCLE, OPEN APPROACH: ICD-10-PCS | Performed by: PODIATRIST

## 2019-08-01 PROCEDURE — 7100000001 HC PACU RECOVERY - ADDTL 15 MIN: Performed by: PODIATRIST

## 2019-08-01 PROCEDURE — 85025 COMPLETE CBC W/AUTO DIFF WBC: CPT

## 2019-08-01 PROCEDURE — 6370000000 HC RX 637 (ALT 250 FOR IP): Performed by: INTERNAL MEDICINE

## 2019-08-01 PROCEDURE — 6360000002 HC RX W HCPCS: Performed by: NURSE ANESTHETIST, CERTIFIED REGISTERED

## 2019-08-01 PROCEDURE — 3700000001 HC ADD 15 MINUTES (ANESTHESIA): Performed by: PODIATRIST

## 2019-08-01 PROCEDURE — 2580000003 HC RX 258: Performed by: INTERNAL MEDICINE

## 2019-08-01 PROCEDURE — 36415 COLL VENOUS BLD VENIPUNCTURE: CPT

## 2019-08-01 PROCEDURE — 87070 CULTURE OTHR SPECIMN AEROBIC: CPT

## 2019-08-01 PROCEDURE — 3600000002 HC SURGERY LEVEL 2 BASE: Performed by: PODIATRIST

## 2019-08-01 PROCEDURE — 94760 N-INVAS EAR/PLS OXIMETRY 1: CPT

## 2019-08-01 PROCEDURE — 3600000012 HC SURGERY LEVEL 2 ADDTL 15MIN: Performed by: PODIATRIST

## 2019-08-01 PROCEDURE — 2500000003 HC RX 250 WO HCPCS: Performed by: NURSE ANESTHETIST, CERTIFIED REGISTERED

## 2019-08-01 RX ORDER — OXYCODONE HYDROCHLORIDE AND ACETAMINOPHEN 5; 325 MG/1; MG/1
2 TABLET ORAL PRN
Status: DISCONTINUED | OUTPATIENT
Start: 2019-08-01 | End: 2019-08-01 | Stop reason: HOSPADM

## 2019-08-01 RX ORDER — SODIUM CHLORIDE 9 MG/ML
INJECTION, SOLUTION INTRAVENOUS CONTINUOUS
Status: DISCONTINUED | OUTPATIENT
Start: 2019-08-01 | End: 2019-08-01

## 2019-08-01 RX ORDER — LIDOCAINE HYDROCHLORIDE 20 MG/ML
INJECTION, SOLUTION EPIDURAL; INFILTRATION; INTRACAUDAL; PERINEURAL PRN
Status: DISCONTINUED | OUTPATIENT
Start: 2019-08-01 | End: 2019-08-01 | Stop reason: SDUPTHER

## 2019-08-01 RX ORDER — MORPHINE SULFATE 2 MG/ML
1 INJECTION, SOLUTION INTRAMUSCULAR; INTRAVENOUS EVERY 5 MIN PRN
Status: DISCONTINUED | OUTPATIENT
Start: 2019-08-01 | End: 2019-08-01 | Stop reason: HOSPADM

## 2019-08-01 RX ORDER — MAGNESIUM HYDROXIDE 1200 MG/15ML
LIQUID ORAL CONTINUOUS PRN
Status: COMPLETED | OUTPATIENT
Start: 2019-08-01 | End: 2019-08-01

## 2019-08-01 RX ORDER — OXYCODONE HYDROCHLORIDE AND ACETAMINOPHEN 5; 325 MG/1; MG/1
1 TABLET ORAL EVERY 4 HOURS PRN
Status: DISCONTINUED | OUTPATIENT
Start: 2019-08-01 | End: 2019-08-03

## 2019-08-01 RX ORDER — SODIUM CHLORIDE 0.9 % (FLUSH) 0.9 %
10 SYRINGE (ML) INJECTION PRN
Status: DISCONTINUED | OUTPATIENT
Start: 2019-08-01 | End: 2019-08-01 | Stop reason: HOSPADM

## 2019-08-01 RX ORDER — DEXAMETHASONE SODIUM PHOSPHATE 4 MG/ML
INJECTION, SOLUTION INTRA-ARTICULAR; INTRALESIONAL; INTRAMUSCULAR; INTRAVENOUS; SOFT TISSUE PRN
Status: DISCONTINUED | OUTPATIENT
Start: 2019-08-01 | End: 2019-08-01 | Stop reason: SDUPTHER

## 2019-08-01 RX ORDER — ONDANSETRON 2 MG/ML
INJECTION INTRAMUSCULAR; INTRAVENOUS PRN
Status: DISCONTINUED | OUTPATIENT
Start: 2019-08-01 | End: 2019-08-01 | Stop reason: SDUPTHER

## 2019-08-01 RX ORDER — OXYCODONE HYDROCHLORIDE AND ACETAMINOPHEN 5; 325 MG/1; MG/1
1 TABLET ORAL PRN
Status: DISCONTINUED | OUTPATIENT
Start: 2019-08-01 | End: 2019-08-01 | Stop reason: HOSPADM

## 2019-08-01 RX ORDER — BUPIVACAINE HYDROCHLORIDE 5 MG/ML
INJECTION, SOLUTION EPIDURAL; INTRACAUDAL
Status: COMPLETED | OUTPATIENT
Start: 2019-08-01 | End: 2019-08-01

## 2019-08-01 RX ORDER — MORPHINE SULFATE 2 MG/ML
2 INJECTION, SOLUTION INTRAMUSCULAR; INTRAVENOUS EVERY 5 MIN PRN
Status: DISCONTINUED | OUTPATIENT
Start: 2019-08-01 | End: 2019-08-01 | Stop reason: HOSPADM

## 2019-08-01 RX ORDER — LIDOCAINE HYDROCHLORIDE 10 MG/ML
INJECTION, SOLUTION EPIDURAL; INFILTRATION; INTRACAUDAL; PERINEURAL
Status: COMPLETED | OUTPATIENT
Start: 2019-08-01 | End: 2019-08-01

## 2019-08-01 RX ORDER — MIDAZOLAM HYDROCHLORIDE 1 MG/ML
INJECTION INTRAMUSCULAR; INTRAVENOUS PRN
Status: DISCONTINUED | OUTPATIENT
Start: 2019-08-01 | End: 2019-08-01 | Stop reason: SDUPTHER

## 2019-08-01 RX ORDER — SODIUM CHLORIDE 0.9 % (FLUSH) 0.9 %
10 SYRINGE (ML) INJECTION EVERY 12 HOURS SCHEDULED
Status: DISCONTINUED | OUTPATIENT
Start: 2019-08-01 | End: 2019-08-01 | Stop reason: HOSPADM

## 2019-08-01 RX ORDER — FENTANYL CITRATE 50 UG/ML
INJECTION, SOLUTION INTRAMUSCULAR; INTRAVENOUS PRN
Status: DISCONTINUED | OUTPATIENT
Start: 2019-08-01 | End: 2019-08-01 | Stop reason: SDUPTHER

## 2019-08-01 RX ORDER — PROPOFOL 10 MG/ML
INJECTION, EMULSION INTRAVENOUS PRN
Status: DISCONTINUED | OUTPATIENT
Start: 2019-08-01 | End: 2019-08-01 | Stop reason: SDUPTHER

## 2019-08-01 RX ORDER — MORPHINE SULFATE 2 MG/ML
1 INJECTION, SOLUTION INTRAMUSCULAR; INTRAVENOUS
Status: DISCONTINUED | OUTPATIENT
Start: 2019-08-01 | End: 2019-08-03

## 2019-08-01 RX ORDER — LINEZOLID 2 MG/ML
600 INJECTION, SOLUTION INTRAVENOUS EVERY 12 HOURS
Status: DISCONTINUED | OUTPATIENT
Start: 2019-08-01 | End: 2019-08-06 | Stop reason: HOSPADM

## 2019-08-01 RX ORDER — HYDRALAZINE HYDROCHLORIDE 20 MG/ML
5 INJECTION INTRAMUSCULAR; INTRAVENOUS
Status: DISCONTINUED | OUTPATIENT
Start: 2019-08-01 | End: 2019-08-01 | Stop reason: HOSPADM

## 2019-08-01 RX ORDER — LABETALOL HYDROCHLORIDE 5 MG/ML
5 INJECTION, SOLUTION INTRAVENOUS EVERY 10 MIN PRN
Status: DISCONTINUED | OUTPATIENT
Start: 2019-08-01 | End: 2019-08-01 | Stop reason: HOSPADM

## 2019-08-01 RX ORDER — ONDANSETRON 2 MG/ML
4 INJECTION INTRAMUSCULAR; INTRAVENOUS
Status: DISCONTINUED | OUTPATIENT
Start: 2019-08-01 | End: 2019-08-01 | Stop reason: HOSPADM

## 2019-08-01 RX ORDER — GLYCOPYRROLATE 0.2 MG/ML
INJECTION INTRAMUSCULAR; INTRAVENOUS PRN
Status: DISCONTINUED | OUTPATIENT
Start: 2019-08-01 | End: 2019-08-01 | Stop reason: SDUPTHER

## 2019-08-01 RX ADMIN — PROPOFOL 100 MG: 10 INJECTION, EMULSION INTRAVENOUS at 14:07

## 2019-08-01 RX ADMIN — MIDAZOLAM 2 MG: 1 INJECTION INTRAMUSCULAR; INTRAVENOUS at 13:59

## 2019-08-01 RX ADMIN — PROPOFOL 100 MG: 10 INJECTION, EMULSION INTRAVENOUS at 14:24

## 2019-08-01 RX ADMIN — INSULIN LISPRO 2 UNITS: 100 INJECTION, SOLUTION INTRAVENOUS; SUBCUTANEOUS at 17:38

## 2019-08-01 RX ADMIN — VANCOMYCIN HYDROCHLORIDE 1000 MG: 1 INJECTION, POWDER, LYOPHILIZED, FOR SOLUTION INTRAVENOUS at 10:25

## 2019-08-01 RX ADMIN — OXYCODONE HYDROCHLORIDE AND ACETAMINOPHEN 1 TABLET: 5; 325 TABLET ORAL at 22:21

## 2019-08-01 RX ADMIN — ONDANSETRON 4 MG: 2 INJECTION INTRAMUSCULAR; INTRAVENOUS at 14:05

## 2019-08-01 RX ADMIN — GLYCOPYRROLATE 0.2 MG: 0.2 INJECTION, SOLUTION INTRAMUSCULAR; INTRAVENOUS at 14:05

## 2019-08-01 RX ADMIN — INSULIN LISPRO 5 UNITS: 100 INJECTION, SOLUTION INTRAVENOUS; SUBCUTANEOUS at 12:24

## 2019-08-01 RX ADMIN — SODIUM CHLORIDE: 9 INJECTION, SOLUTION INTRAVENOUS at 13:58

## 2019-08-01 RX ADMIN — PROPOFOL 100 MG: 10 INJECTION, EMULSION INTRAVENOUS at 14:03

## 2019-08-01 RX ADMIN — PROPOFOL 100 MG: 10 INJECTION, EMULSION INTRAVENOUS at 14:20

## 2019-08-01 RX ADMIN — INSULIN GLARGINE 18 UNITS: 100 INJECTION, SOLUTION SUBCUTANEOUS at 20:43

## 2019-08-01 RX ADMIN — INSULIN LISPRO 2 UNITS: 100 INJECTION, SOLUTION INTRAVENOUS; SUBCUTANEOUS at 20:43

## 2019-08-01 RX ADMIN — PROPOFOL 100 MG: 10 INJECTION, EMULSION INTRAVENOUS at 14:35

## 2019-08-01 RX ADMIN — LINEZOLID 600 MG: 600 INJECTION, SOLUTION INTRAVENOUS at 11:38

## 2019-08-01 RX ADMIN — IBUPROFEN 600 MG: 600 TABLET ORAL at 09:10

## 2019-08-01 RX ADMIN — INSULIN LISPRO 5 UNITS: 100 INJECTION, SOLUTION INTRAVENOUS; SUBCUTANEOUS at 17:38

## 2019-08-01 RX ADMIN — LIDOCAINE HYDROCHLORIDE 100 MG: 20 INJECTION, SOLUTION EPIDURAL; INFILTRATION; INTRACAUDAL; PERINEURAL at 14:03

## 2019-08-01 RX ADMIN — FENTANYL CITRATE 100 MCG: 50 INJECTION INTRAMUSCULAR; INTRAVENOUS at 14:03

## 2019-08-01 RX ADMIN — DEXAMETHASONE SODIUM PHOSPHATE 8 MG: 4 INJECTION, SOLUTION INTRAMUSCULAR; INTRAVENOUS at 14:05

## 2019-08-01 ASSESSMENT — PULMONARY FUNCTION TESTS
PIF_VALUE: 0

## 2019-08-01 ASSESSMENT — PAIN DESCRIPTION - DESCRIPTORS
DESCRIPTORS: THROBBING

## 2019-08-01 ASSESSMENT — PAIN DESCRIPTION - PROGRESSION
CLINICAL_PROGRESSION: GRADUALLY WORSENING
CLINICAL_PROGRESSION: NOT CHANGED

## 2019-08-01 ASSESSMENT — PAIN SCALES - GENERAL
PAINLEVEL_OUTOF10: 0
PAINLEVEL_OUTOF10: 3
PAINLEVEL_OUTOF10: 0
PAINLEVEL_OUTOF10: 0
PAINLEVEL_OUTOF10: 2
PAINLEVEL_OUTOF10: 4

## 2019-08-01 ASSESSMENT — PAIN - FUNCTIONAL ASSESSMENT
PAIN_FUNCTIONAL_ASSESSMENT: PREVENTS OR INTERFERES SOME ACTIVE ACTIVITIES AND ADLS
PAIN_FUNCTIONAL_ASSESSMENT: 0-10
PAIN_FUNCTIONAL_ASSESSMENT: PREVENTS OR INTERFERES SOME ACTIVE ACTIVITIES AND ADLS
PAIN_FUNCTIONAL_ASSESSMENT: PREVENTS OR INTERFERES SOME ACTIVE ACTIVITIES AND ADLS

## 2019-08-01 ASSESSMENT — PAIN DESCRIPTION - DIRECTION
RADIATING_TOWARDS: ANKLE
RADIATING_TOWARDS: ANKLE

## 2019-08-01 ASSESSMENT — PAIN DESCRIPTION - ORIENTATION
ORIENTATION: LEFT
ORIENTATION: LEFT

## 2019-08-01 ASSESSMENT — PAIN DESCRIPTION - FREQUENCY
FREQUENCY: CONTINUOUS
FREQUENCY: CONTINUOUS

## 2019-08-01 ASSESSMENT — PAIN DESCRIPTION - ONSET
ONSET: ON-GOING
ONSET: ON-GOING

## 2019-08-01 ASSESSMENT — PAIN DESCRIPTION - LOCATION
LOCATION: FOOT
LOCATION: FOOT

## 2019-08-01 ASSESSMENT — ENCOUNTER SYMPTOMS: SHORTNESS OF BREATH: 0

## 2019-08-01 ASSESSMENT — PAIN DESCRIPTION - PAIN TYPE
TYPE: ACUTE PAIN;SURGICAL PAIN
TYPE: ACUTE PAIN;SURGICAL PAIN

## 2019-08-01 NOTE — PROGRESS NOTES
Smoking status: Former Smoker    Smokeless tobacco: Never Used   Substance Use Topics    Alcohol use: No     Alcohol/week: 0.0 standard drinks    Drug use: No     Social History     Tobacco Use   Smoking Status Former Smoker   Smokeless Tobacco Never Used      Family History   Problem Relation Age of Onset    Stroke Maternal Grandmother     Asthma Maternal Grandmother     Stroke Maternal Grandfather         REVIEW OF SYSTEMS:    No fever / chills / sweats. No weight loss. No visual change, eye pain, eye discharge. No oral lesion, sore throat, dysphagia. Denies cough / sputum/Sob   Denies chest pain, palpitations/ dizziness  Denies nausea/ vomiting/abdominal pain/diarrhea. Denies dysuria or change in urinary function. Denies joint swelling or pain. No myalgia, arthralgia. No rashes, skin lesions   Denies focal weakness, sensory change or other neurologic symptoms  No lymph node swelling or tenderness.     Left foot swelling, local redness, pain    PHYSICAL EXAM:      Vitals:    BP (!) 141/74   Pulse 59   Temp 98 °F (36.7 °C) (Oral)   Resp 16   Ht 5' 5\" (1.651 m)   Wt 187 lb 4.8 oz (85 kg)   LMP  (LMP Unknown)   SpO2 99%   BMI 31.17 kg/m²   General Appearance: alert,in no acute distress, +  pallor, no icterus   Skin: warm and dry, no rash or erythema  Head: normocephalic and atraumatic  Eyes: pupils equal, round, and reactive to light, conjunctivae normal  ENT: tympanic membrane, external ear and ear canal normal bilaterally, nose without deformity, nasal mucosa and turbinates normal without polyps  Neck: supple and non-tender without mass, no thyromegaly  no cervical lymphadenopathy  Pulmonary/Chest: clear to auscultation bilaterally- no wheezes, rales or rhonchi, normal air movement, no respiratory distress  Cardiovascular: normal rate, regular rhythm, normal S1 and S2, no murmurs, rubs, clicks, or gallops, no carotid bruits  Abdomen: soft, non-tender, non-distended, normal bowel sounds, no masses or organomegaly  Extremities: no cyanosis, clubbing or edema  Musculoskeletal: normal range of motion, no joint swelling, deformity or tenderness  Neurologic: reflexes normal and symmetric, no cranial nerve deficit  Psych:  Orientation, sensorium, mood normal  Lines:  IV  Left leg swelling, warmth, left foot plantar ulcer, local cellulitis,   Post op dressing+       Data Review:    Lab Results   Component Value Date    WBC 6.4 08/01/2019    HGB 12.9 08/01/2019    HCT 38.5 08/01/2019    MCV 84.8 08/01/2019     08/01/2019     Lab Results   Component Value Date    CREATININE 0.8 08/01/2019    BUN 14 08/01/2019     08/01/2019    K 4.3 08/01/2019     08/01/2019    CO2 27 08/01/2019       Hepatic Function Panel:   Lab Results   Component Value Date    ALKPHOS 66 07/29/2019    ALT 10 07/29/2019    AST 14 07/29/2019    PROT 6.8 07/29/2019    BILITOT <0.2 07/29/2019    BILIDIR <0.2 07/29/2019    IBILI see below 07/29/2019    LABALBU 3.8 08/01/2019       UA:  Lab Results   Component Value Date    COLORU Yellow 03/29/2019    CLARITYU Clear 03/29/2019    GLUCOSEU Negative 03/29/2019    BILIRUBINUR Negative 03/29/2019    BILIRUBINUR negative 09/02/2014    KETUA Negative 03/29/2019    SPECGRAV 1.015 03/29/2019    BLOODU Negative 03/29/2019    PHUR 7.5 03/29/2019    PROTEINU Negative 03/29/2019    UROBILINOGEN 0.2 03/29/2019    NITRU Negative 03/29/2019    LEUKOCYTESUR SMALL 03/29/2019    LABMICR <1.20 06/27/2019    LABMICR YES 03/29/2019    URINETYPE Voided 03/29/2019      Urine Microscopic:   Lab Results   Component Value Date    BACTERIA Rare 03/29/2019    WBCUA 0-2 03/29/2019    RBCUA 0-2 03/29/2019    EPIU 3-5 03/29/2019       MICRO: cultures reviewed and updated by me   19 [id number]       Order Status: Completed Specimen: Foot Updated: 08/01/19 0858    WOUND/ABSCESS --    Gram Stain Result No WBCs or organisms seen   No Epithelial Cells seen     Organism Staph aureus MRSAAbnormal     WOUND/ABSCESS

## 2019-08-01 NOTE — PROGRESS NOTES
focus within the underlying soft tissues which is slightly hyperintense   compared with the remaining tissue but it is not fluid intensity. Findings   may either represent phlegmonous change or focal edema. Findings were   discussed with Dr. Johann Rhoades on July 31, 2019 at approximately 11:55   a.m. .         Final              Assessment/Plan:    Active Hospital Problems    Diagnosis    Cellulitis of left foot [L03.116]       Cellulitis with suspected myositis and possible tendonitis - diabetic foot infection  In setting of exposure [place] - possible vibrio. Pseudomonas remains on the differential for this pt with long standing DMII. Wound culture comes back with MRSA. Plan:               - MRI left foot no OM, no abscess              - cont Vanc and Levaquin IV - to IV zyvox. - podiatry eval - Dr Shima Neri aware of the patient - plan for OR today   - ID eval.         DMII uncontrolled due to infection.    Cont home regimen of  lantus  prand bolus  ISS  Carb control diet.         HLD on statin.               DVT Prophylaxis: lovenox  Diet: carb control  Code Status: full code        Dispo - inpatient.          Ivonne Blair MD

## 2019-08-01 NOTE — PROGRESS NOTES
Called Pre-op to give report. Awaiting call back will continue to monitor per unit protocols.  Electronically signed by Vincent Baig RN on 8/1/2019 at 1:48 PM

## 2019-08-01 NOTE — ANESTHESIA PRE PROCEDURE
last liquid consumption: 07/31/19   Time of last liquid consumption: 0000   Date of last solid food consumption: 07/31/19      Time of last solid consumption: 0000       Anesthesia Evaluation  Patient summary reviewed  Airway: Mallampati: III  TM distance: >3 FB   Neck ROM: full  Mouth opening: > = 3 FB Dental: normal exam         Pulmonary:       (-) COPD, asthma and shortness of breath                           Cardiovascular:  Exercise tolerance: good (>4 METS),       (-) hypertension, valvular problems/murmurs, past MI, CAD, CABG/stent, dysrhythmias and  angina                Neuro/Psych:      (-) seizures, TIA and CVA           GI/Hepatic/Renal:   (+) GERD:,      (-) PUD, liver disease and no renal disease       Endo/Other:    (+) Diabetes, hypothyroidism::., .                 Abdominal:           Vascular: negative vascular ROS. Anesthesia Plan      MAC     ASA 3     (I discussed local anesthesia with intravenous sedation to the   patient's satisfaction and agreed including risks and alternatives. The  patient has no further questions. FRANKIE CUMMINGS )  Induction: intravenous. Anesthetic plan and risks discussed with patient and spouse. Plan discussed with CRNA. This pre-anesthesia assessment may be used as a history and physical.    DOS STAFF ADDENDUM:    Pt seen and examined, chart reviewed (including anesthesia, drug and allergy history). No interval changes to history and physical examination. Anesthetic plan, risks, benefits, alternatives, and personnel involved discussed with patient. Patient verbalized an understanding and agrees to proceed.       Yesica Raza MD  August 1, 2019  1:30 PM

## 2019-08-01 NOTE — PROGRESS NOTES
Patient is resting in bed, awake and eating dinner. Room air. Side rails are up x2. Family is at bedside. Patient denies pain presently. Fall precuations are in place. Bed alarm on. Bed is in lowest position. Call light is in reach. Will continue to monitor patient per unit protocols.  Electronically signed by Kingston Ballesteros RN on 8/1/2019 at 6:45 PM

## 2019-08-01 NOTE — PROGRESS NOTES
Patient arrived to the Ortho unit from PACU. Patient is resting in bed, awake and quiet. Room air. Side rails are up x2. Patient denies pain presently. Family is at bedside. Fall precautions are in place. Bed is in lowest position. Call light is in reach. Will continue to monitor patient per unit protocols.  Electronically signed by Gino Nixon RN on 8/1/2019 at 4:11 PM

## 2019-08-02 ENCOUNTER — ANESTHESIA EVENT (OUTPATIENT)
Dept: OPERATING ROOM | Age: 59
DRG: 982 | End: 2019-08-02
Payer: COMMERCIAL

## 2019-08-02 LAB
ALBUMIN SERPL-MCNC: 3.6 G/DL (ref 3.4–5)
ANION GAP SERPL CALCULATED.3IONS-SCNC: 12 MMOL/L (ref 3–16)
BASOPHILS ABSOLUTE: 0 K/UL (ref 0–0.2)
BASOPHILS RELATIVE PERCENT: 0.1 %
BUN BLDV-MCNC: 15 MG/DL (ref 7–20)
CALCIUM SERPL-MCNC: 9.2 MG/DL (ref 8.3–10.6)
CHLORIDE BLD-SCNC: 95 MMOL/L (ref 99–110)
CO2: 24 MMOL/L (ref 21–32)
CREAT SERPL-MCNC: 0.8 MG/DL (ref 0.6–1.1)
EOSINOPHILS ABSOLUTE: 0 K/UL (ref 0–0.6)
EOSINOPHILS RELATIVE PERCENT: 0 %
GFR AFRICAN AMERICAN: >60
GFR NON-AFRICAN AMERICAN: >60
GLUCOSE BLD-MCNC: 312 MG/DL (ref 70–99)
GLUCOSE BLD-MCNC: 322 MG/DL (ref 70–99)
GLUCOSE BLD-MCNC: 342 MG/DL (ref 70–99)
GLUCOSE BLD-MCNC: 343 MG/DL (ref 70–99)
GLUCOSE BLD-MCNC: 367 MG/DL (ref 70–99)
HCT VFR BLD CALC: 36.9 % (ref 36–48)
HEMOGLOBIN: 12 G/DL (ref 12–16)
LYMPHOCYTES ABSOLUTE: 1.3 K/UL (ref 1–5.1)
LYMPHOCYTES RELATIVE PERCENT: 11 %
MCH RBC QN AUTO: 27.5 PG (ref 26–34)
MCHC RBC AUTO-ENTMCNC: 32.6 G/DL (ref 31–36)
MCV RBC AUTO: 84.5 FL (ref 80–100)
MONOCYTES ABSOLUTE: 0.4 K/UL (ref 0–1.3)
MONOCYTES RELATIVE PERCENT: 3.4 %
NEUTROPHILS ABSOLUTE: 9.8 K/UL (ref 1.7–7.7)
NEUTROPHILS RELATIVE PERCENT: 85.5 %
PDW BLD-RTO: 13.8 % (ref 12.4–15.4)
PERFORMED ON: ABNORMAL
PHOSPHORUS: 4.2 MG/DL (ref 2.5–4.9)
PLATELET # BLD: 320 K/UL (ref 135–450)
PMV BLD AUTO: 8.3 FL (ref 5–10.5)
POTASSIUM SERPL-SCNC: 5.1 MMOL/L (ref 3.5–5.1)
RBC # BLD: 4.37 M/UL (ref 4–5.2)
SODIUM BLD-SCNC: 131 MMOL/L (ref 136–145)
WBC # BLD: 11.5 K/UL (ref 4–11)

## 2019-08-02 PROCEDURE — 80069 RENAL FUNCTION PANEL: CPT

## 2019-08-02 PROCEDURE — 6370000000 HC RX 637 (ALT 250 FOR IP): Performed by: PODIATRIST

## 2019-08-02 PROCEDURE — 36415 COLL VENOUS BLD VENIPUNCTURE: CPT

## 2019-08-02 PROCEDURE — 97116 GAIT TRAINING THERAPY: CPT

## 2019-08-02 PROCEDURE — 94760 N-INVAS EAR/PLS OXIMETRY 1: CPT

## 2019-08-02 PROCEDURE — 97530 THERAPEUTIC ACTIVITIES: CPT

## 2019-08-02 PROCEDURE — 6370000000 HC RX 637 (ALT 250 FOR IP): Performed by: INTERNAL MEDICINE

## 2019-08-02 PROCEDURE — 85025 COMPLETE CBC W/AUTO DIFF WBC: CPT

## 2019-08-02 PROCEDURE — 99233 SBSQ HOSP IP/OBS HIGH 50: CPT | Performed by: INTERNAL MEDICINE

## 2019-08-02 PROCEDURE — 6360000002 HC RX W HCPCS: Performed by: PODIATRIST

## 2019-08-02 PROCEDURE — 97165 OT EVAL LOW COMPLEX 30 MIN: CPT

## 2019-08-02 PROCEDURE — 1200000000 HC SEMI PRIVATE

## 2019-08-02 PROCEDURE — 97161 PT EVAL LOW COMPLEX 20 MIN: CPT

## 2019-08-02 RX ORDER — MORPHINE SULFATE 2 MG/ML
0.5 INJECTION, SOLUTION INTRAMUSCULAR; INTRAVENOUS ONCE
Status: COMPLETED | OUTPATIENT
Start: 2019-08-02 | End: 2019-08-02

## 2019-08-02 RX ORDER — INSULIN GLARGINE 100 [IU]/ML
24 INJECTION, SOLUTION SUBCUTANEOUS NIGHTLY
Status: DISCONTINUED | OUTPATIENT
Start: 2019-08-02 | End: 2019-08-03

## 2019-08-02 RX ORDER — CALCIUM CARBONATE 200(500)MG
500 TABLET,CHEWABLE ORAL 3 TIMES DAILY PRN
Status: DISCONTINUED | OUTPATIENT
Start: 2019-08-02 | End: 2019-08-06 | Stop reason: HOSPADM

## 2019-08-02 RX ADMIN — INSULIN LISPRO 5 UNITS: 100 INJECTION, SOLUTION INTRAVENOUS; SUBCUTANEOUS at 08:33

## 2019-08-02 RX ADMIN — OXYCODONE HYDROCHLORIDE AND ACETAMINOPHEN 1 TABLET: 5; 325 TABLET ORAL at 10:06

## 2019-08-02 RX ADMIN — OXYCODONE HYDROCHLORIDE AND ACETAMINOPHEN 1 TABLET: 5; 325 TABLET ORAL at 02:41

## 2019-08-02 RX ADMIN — MORPHINE SULFATE 0.5 MG: 2 INJECTION, SOLUTION INTRAMUSCULAR; INTRAVENOUS at 08:46

## 2019-08-02 RX ADMIN — INSULIN LISPRO 4 UNITS: 100 INJECTION, SOLUTION INTRAVENOUS; SUBCUTANEOUS at 17:25

## 2019-08-02 RX ADMIN — INSULIN LISPRO 2 UNITS: 100 INJECTION, SOLUTION INTRAVENOUS; SUBCUTANEOUS at 21:56

## 2019-08-02 RX ADMIN — LINEZOLID 600 MG: 600 INJECTION, SOLUTION INTRAVENOUS at 01:33

## 2019-08-02 RX ADMIN — INSULIN LISPRO 7 UNITS: 100 INJECTION, SOLUTION INTRAVENOUS; SUBCUTANEOUS at 17:27

## 2019-08-02 RX ADMIN — INSULIN LISPRO 4 UNITS: 100 INJECTION, SOLUTION INTRAVENOUS; SUBCUTANEOUS at 08:33

## 2019-08-02 RX ADMIN — SIMVASTATIN 40 MG: 40 TABLET, FILM COATED ORAL at 08:32

## 2019-08-02 RX ADMIN — INSULIN GLARGINE 24 UNITS: 100 INJECTION, SOLUTION SUBCUTANEOUS at 21:57

## 2019-08-02 RX ADMIN — LEVOTHYROXINE SODIUM 50 MCG: 50 TABLET ORAL at 05:50

## 2019-08-02 RX ADMIN — INSULIN LISPRO 4 UNITS: 100 INJECTION, SOLUTION INTRAVENOUS; SUBCUTANEOUS at 12:39

## 2019-08-02 RX ADMIN — ENOXAPARIN SODIUM 40 MG: 40 INJECTION SUBCUTANEOUS at 08:32

## 2019-08-02 RX ADMIN — LINEZOLID 600 MG: 600 INJECTION, SOLUTION INTRAVENOUS at 12:39

## 2019-08-02 RX ADMIN — INSULIN LISPRO 7 UNITS: 100 INJECTION, SOLUTION INTRAVENOUS; SUBCUTANEOUS at 12:40

## 2019-08-02 RX ADMIN — ASPIRIN 81 MG: 81 TABLET ORAL at 08:32

## 2019-08-02 RX ADMIN — ANTACID TABLETS 500 MG: 500 TABLET, CHEWABLE ORAL at 17:25

## 2019-08-02 RX ADMIN — OXYCODONE HYDROCHLORIDE AND ACETAMINOPHEN 1 TABLET: 5; 325 TABLET ORAL at 16:28

## 2019-08-02 ASSESSMENT — PAIN SCALES - GENERAL
PAINLEVEL_OUTOF10: 6
PAINLEVEL_OUTOF10: 5
PAINLEVEL_OUTOF10: 4
PAINLEVEL_OUTOF10: 6
PAINLEVEL_OUTOF10: 5
PAINLEVEL_OUTOF10: 4
PAINLEVEL_OUTOF10: 0
PAINLEVEL_OUTOF10: 5
PAINLEVEL_OUTOF10: 5
PAINLEVEL_OUTOF10: 3

## 2019-08-02 ASSESSMENT — PAIN DESCRIPTION - ONSET
ONSET: ON-GOING

## 2019-08-02 ASSESSMENT — PAIN - FUNCTIONAL ASSESSMENT
PAIN_FUNCTIONAL_ASSESSMENT: PREVENTS OR INTERFERES SOME ACTIVE ACTIVITIES AND ADLS

## 2019-08-02 ASSESSMENT — PAIN DESCRIPTION - ORIENTATION
ORIENTATION: LEFT

## 2019-08-02 ASSESSMENT — PAIN DESCRIPTION - PAIN TYPE
TYPE: ACUTE PAIN;SURGICAL PAIN
TYPE: ACUTE PAIN
TYPE: ACUTE PAIN;SURGICAL PAIN

## 2019-08-02 ASSESSMENT — PAIN DESCRIPTION - FREQUENCY
FREQUENCY: CONTINUOUS

## 2019-08-02 ASSESSMENT — PAIN DESCRIPTION - PROGRESSION
CLINICAL_PROGRESSION: GRADUALLY IMPROVING
CLINICAL_PROGRESSION: NOT CHANGED

## 2019-08-02 ASSESSMENT — PAIN DESCRIPTION - DESCRIPTORS
DESCRIPTORS: THROBBING

## 2019-08-02 ASSESSMENT — PAIN DESCRIPTION - DIRECTION
RADIATING_TOWARDS: ANKLE

## 2019-08-02 ASSESSMENT — PAIN DESCRIPTION - LOCATION
LOCATION: FOOT

## 2019-08-02 NOTE — PROGRESS NOTES
MUSCULOSKELETAL:  Tenderness noted to left forefoot diffusely. Muscle strength is 5/5 to all muscle groups and symmetrical bilaterally. Range of motion is reduced to the ankle joint, STJ and 1st MPJ, bilaterally.     Pain with passive ROM of lesser digits 3 and 4 left improved. IMAGING:  Imaging:  Narrative   EXAMINATION:   MRI OF THE LEFT FOOT WITHOUT CONTRAST, 7/30/2019 11:01 am       TECHNIQUE:   Multiplanar multisequence MRI of the left foot was performed without the   administration of intravenous contrast.       COMPARISON:   None       HISTORY:   ORDERING SYSTEM PROVIDED HISTORY: cellulitis, eval for deep soft tissue/bone   infection. TECHNOLOGIST PROVIDED HISTORY:   Reason for Exam: cellulitis, eval for deep soft tissue/bone infection. Acuity: Acute   Type of Exam: Initial   Additional signs and symptoms: LT foot swelling under toes with sensitivity   to top and bottom of foot.       FINDINGS:   No acute fracture or osteonecrosis.  No evidence of osteomyelitis.       Diffuse soft tissue swelling and edema most prominent dorsal aspect of the   foot.  No fluid collection. .       Intraosseous cyst versus erosion medial 1st metatarsal head.  Small marginal   osteophytes at the 1st MTP joint.  No significant joint effusion.       Flexor and extensor tendons are intact.           Impression   Soft tissue swelling and edema most prominent dorsal aspect of the foot.  No   osteomyelitis.      ADDENDUM:  Additional history of the patient is that she recently had an incision along  the plantar aspect of her foot and is now having some pus drainage. Oleta Sand is  a small focal defect in the skin plantar aspect between the 3rd and 4th  metatarsals at the level of the MTP joints.  There is an approximately 7 x 6  mm focus within the underlying soft tissues which is slightly hyperintense  compared with the remaining tissue but it is not fluid intensity.  Findings  may either represent phlegmonous change or

## 2019-08-02 NOTE — PROGRESS NOTES
[place], [employer] [address]   Phone [phone]   Susceptibility     Staph aureus mrsa (1)     Antibiotic Interpretation KRUPA Status    ceFAZolin Resistant 16 mcg/mL     ciprofloxacin Sensitive <=1 mcg/mL     clindamycin Sensitive <=0.5 mcg/mL     erythromycin Resistant >4 mcg/mL     oxacillin Resistant >2 mcg/mL     tetracycline Sensitive <=4 mcg/mL     trimethoprim-sulfamethoxazole Sensitive <=0.5/9.5 mcg/mL     vancomycin Sensitive 2 mcg/mL              Surgical Culture [[id number]] Collected: 08/01/19 1421   Order Status: Completed Specimen: Specimen from Foot Updated: 08/02/19 0949    Culture Surgical Growth too young. further report to follow    Anaerobic Culture Further report to follow   Narrative:     ORDER#: [id number]                          ORDERED BY: RICK MERCHANT  SOURCE: Foot                               COLLECTED:  08/01/19 14:21  ANTIBIOTICS AT [facility]. :                      RECEIVED :  08/01/19 14:50  Performed at:  [place] [address] [address] [facility] [employer] [address]   Phone [phone]   Surgical Culture [[id number]] Collected: 08/01/19 1421   Order Status: Completed Specimen: Specimen from Foot Updated: 08/02/19 0949    Culture Surgical Growth too young. further report to follow    Anaerobic Culture Further report to follow   Narrative:     ORDER#: [id number]                          ORDERED BY: RICK MERCHANT  SOURCE: Foot                               COLLECTED:  08/01/19 14:21             IMAGING:    MRI FOOT LEFT WO CONTRAST   Final Result   Addendum 1 of 1   ADDENDUM:   Additional history of the patient is that she recently had an incision    along   the plantar aspect of her foot and is now having some pus drainage. There    is   a small focal defect in the skin plantar aspect between the 3rd and 4th   metatarsals at the level of the MTP joints.   There is an approximately 7 x    6   mm focus within the underlying soft tissues which is

## 2019-08-02 NOTE — DISCHARGE INSTR - COC
Hyperlipidemia E78.5    Diabetes mellitus type 1 (HCC) E10.9    Hypokalemia E87.6    Abdominal pain R10.9    Knee pain, right M25.561    Fatigue R53.83    Cold intolerance R68.89    Pedal edema R60.0    GERD (gastroesophageal reflux disease) K21.9    Pharyngitis J02.9    Chronic pain of right ankle M25.571, G89.29    Acquired hypothyroidism E03.9    Arthralgia of both hands M25.541, M25.542    Bronchitis J40    Cellulitis of left foot L03.116       Isolation/Infection:   Isolation          Contact        Patient Infection Status     Infection Onset Added Last Indicated Last Indicated By Review Planned Expiration Resolved Resolved By    [facility] 07/29/19 07/31/19 07/29/19 Wound Culture        7/29/19 foot          Nurse Assessment:  Last Vital Signs: /69   Pulse 62   Temp 97.5 °F (36.4 °C) (Oral)   Resp 16   Ht 5' 5\" (1.651 m)   Wt 192 lb 10.9 oz (87.4 kg)   LMP  (LMP Unknown)   SpO2 96%   BMI 32.06 kg/m²     Last documented pain score (0-10 scale): Pain Level: 3  Last Weight:   Wt Readings from Last 1 Encounters:   08/02/19 192 lb 10.9 oz (87.4 kg)     Mental Status:  {IP PT MENTAL STATUS:20030}    IV Access:  { MARC IV ACCESS:098423980}    Nursing Mobility/ADLs:  Walking   {CHP DME YLBY:954121010}  Transfer  {CHP DME QGEP:132729129}  Bathing  {CHP DME MQOB:586517343}  Dressing  {CHP DME MTJD:027857279}  Toileting  {CHP DME KVGT:876643988}  Feeding  {P DME NKUA:765394073}  Med Admin  {P DME GXKK:728142353}  Med Delivery   { MARC MED Delivery:203729907}    Wound Care Documentation and Therapy:        Elimination:  Continence:   · Bowel: {YES / HT:67806}  · Bladder: {YES / IT:39025}  Urinary Catheter: {Urinary Catheter:295970006}   Colostomy/Ileostomy/Ileal Conduit: {YES / JV:74311}       Date of Last BM: ***    Intake/Output Summary (Last 24 hours) at 8/2/2019 1135  Last data filed at 8/2/2019 0923  Gross per 24 hour   Intake 1160 ml   Output --   Net 1160 ml     I/O last 3 completed

## 2019-08-02 NOTE — PROGRESS NOTES
Patient is resting in bed, awake and quiet. Room air. Side rails are up x2. Patient is UAL. Fall precautions are in place. Bed is in lowest position. Call light is in reach. Patient denies pain at present. Will continue to monitor patient per unit protocols.  Electronically signed by Shaaron Schaumann, RN on 8/2/2019 at 5:37 PM

## 2019-08-03 LAB
ALBUMIN SERPL-MCNC: 3.5 G/DL (ref 3.4–5)
ANION GAP SERPL CALCULATED.3IONS-SCNC: 9 MMOL/L (ref 3–16)
BASOPHILS ABSOLUTE: 0.1 K/UL (ref 0–0.2)
BASOPHILS RELATIVE PERCENT: 0.8 %
BLOOD CULTURE, ROUTINE: NORMAL
BUN BLDV-MCNC: 19 MG/DL (ref 7–20)
CALCIUM SERPL-MCNC: 9.2 MG/DL (ref 8.3–10.6)
CHLORIDE BLD-SCNC: 100 MMOL/L (ref 99–110)
CO2: 27 MMOL/L (ref 21–32)
CREAT SERPL-MCNC: 0.9 MG/DL (ref 0.6–1.1)
CULTURE, BLOOD 2: NORMAL
EOSINOPHILS ABSOLUTE: 0.2 K/UL (ref 0–0.6)
EOSINOPHILS RELATIVE PERCENT: 2 %
GFR AFRICAN AMERICAN: >60
GFR NON-AFRICAN AMERICAN: >60
GLUCOSE BLD-MCNC: 123 MG/DL (ref 70–99)
GLUCOSE BLD-MCNC: 170 MG/DL (ref 70–99)
GLUCOSE BLD-MCNC: 172 MG/DL (ref 70–99)
GLUCOSE BLD-MCNC: 195 MG/DL (ref 70–99)
GLUCOSE BLD-MCNC: 250 MG/DL (ref 70–99)
HCT VFR BLD CALC: 32.9 % (ref 36–48)
HEMOGLOBIN: 11.2 G/DL (ref 12–16)
LYMPHOCYTES ABSOLUTE: 3.7 K/UL (ref 1–5.1)
LYMPHOCYTES RELATIVE PERCENT: 40.9 %
MCH RBC QN AUTO: 28.9 PG (ref 26–34)
MCHC RBC AUTO-ENTMCNC: 34 G/DL (ref 31–36)
MCV RBC AUTO: 84.9 FL (ref 80–100)
MONOCYTES ABSOLUTE: 0.6 K/UL (ref 0–1.3)
MONOCYTES RELATIVE PERCENT: 6.2 %
NEUTROPHILS ABSOLUTE: 4.6 K/UL (ref 1.7–7.7)
NEUTROPHILS RELATIVE PERCENT: 50.1 %
PDW BLD-RTO: 13.8 % (ref 12.4–15.4)
PERFORMED ON: ABNORMAL
PHOSPHORUS: 3.4 MG/DL (ref 2.5–4.9)
PLATELET # BLD: 283 K/UL (ref 135–450)
PMV BLD AUTO: 8.1 FL (ref 5–10.5)
POTASSIUM SERPL-SCNC: 4.3 MMOL/L (ref 3.5–5.1)
RBC # BLD: 3.88 M/UL (ref 4–5.2)
SODIUM BLD-SCNC: 136 MMOL/L (ref 136–145)
WBC # BLD: 9.1 K/UL (ref 4–11)

## 2019-08-03 PROCEDURE — 6370000000 HC RX 637 (ALT 250 FOR IP): Performed by: INTERNAL MEDICINE

## 2019-08-03 PROCEDURE — 2580000003 HC RX 258: Performed by: INTERNAL MEDICINE

## 2019-08-03 PROCEDURE — 6360000002 HC RX W HCPCS: Performed by: PODIATRIST

## 2019-08-03 PROCEDURE — 2580000003 HC RX 258: Performed by: ANESTHESIOLOGY

## 2019-08-03 PROCEDURE — 6370000000 HC RX 637 (ALT 250 FOR IP): Performed by: PODIATRIST

## 2019-08-03 PROCEDURE — 36415 COLL VENOUS BLD VENIPUNCTURE: CPT

## 2019-08-03 PROCEDURE — 94760 N-INVAS EAR/PLS OXIMETRY 1: CPT

## 2019-08-03 PROCEDURE — 80069 RENAL FUNCTION PANEL: CPT

## 2019-08-03 PROCEDURE — 99233 SBSQ HOSP IP/OBS HIGH 50: CPT | Performed by: INTERNAL MEDICINE

## 2019-08-03 PROCEDURE — 85025 COMPLETE CBC W/AUTO DIFF WBC: CPT

## 2019-08-03 PROCEDURE — 1200000000 HC SEMI PRIVATE

## 2019-08-03 RX ORDER — MORPHINE SULFATE 2 MG/ML
1 INJECTION, SOLUTION INTRAMUSCULAR; INTRAVENOUS
Status: DISCONTINUED | OUTPATIENT
Start: 2019-08-03 | End: 2019-08-06 | Stop reason: HOSPADM

## 2019-08-03 RX ORDER — INSULIN GLARGINE 100 [IU]/ML
25 INJECTION, SOLUTION SUBCUTANEOUS NIGHTLY
Status: DISCONTINUED | OUTPATIENT
Start: 2019-08-03 | End: 2019-08-04

## 2019-08-03 RX ORDER — PANTOPRAZOLE SODIUM 40 MG/1
40 TABLET, DELAYED RELEASE ORAL
Status: DISCONTINUED | OUTPATIENT
Start: 2019-08-03 | End: 2019-08-06 | Stop reason: HOSPADM

## 2019-08-03 RX ORDER — OXYCODONE HYDROCHLORIDE AND ACETAMINOPHEN 5; 325 MG/1; MG/1
1 TABLET ORAL EVERY 4 HOURS PRN
Status: DISCONTINUED | OUTPATIENT
Start: 2019-08-03 | End: 2019-08-06 | Stop reason: HOSPADM

## 2019-08-03 RX ORDER — MAGNESIUM HYDROXIDE/ALUMINUM HYDROXICE/SIMETHICONE 120; 1200; 1200 MG/30ML; MG/30ML; MG/30ML
30 SUSPENSION ORAL EVERY 6 HOURS PRN
Status: DISCONTINUED | OUTPATIENT
Start: 2019-08-03 | End: 2019-08-06 | Stop reason: HOSPADM

## 2019-08-03 RX ORDER — SODIUM CHLORIDE 0.9 % (FLUSH) 0.9 %
10 SYRINGE (ML) INJECTION PRN
Status: DISCONTINUED | OUTPATIENT
Start: 2019-08-03 | End: 2019-08-05 | Stop reason: HOSPADM

## 2019-08-03 RX ORDER — SODIUM CHLORIDE 0.9 % (FLUSH) 0.9 %
10 SYRINGE (ML) INJECTION EVERY 12 HOURS SCHEDULED
Status: DISCONTINUED | OUTPATIENT
Start: 2019-08-03 | End: 2019-08-05 | Stop reason: HOSPADM

## 2019-08-03 RX ORDER — SODIUM CHLORIDE 9 MG/ML
INJECTION, SOLUTION INTRAVENOUS CONTINUOUS
Status: ACTIVE | OUTPATIENT
Start: 2019-08-03 | End: 2019-08-03

## 2019-08-03 RX ORDER — SODIUM CHLORIDE 9 MG/ML
INJECTION, SOLUTION INTRAVENOUS CONTINUOUS
Status: DISCONTINUED | OUTPATIENT
Start: 2019-08-03 | End: 2019-08-03

## 2019-08-03 RX ADMIN — PANTOPRAZOLE SODIUM 40 MG: 40 TABLET, DELAYED RELEASE ORAL at 12:39

## 2019-08-03 RX ADMIN — INSULIN LISPRO 8 UNITS: 100 INJECTION, SOLUTION INTRAVENOUS; SUBCUTANEOUS at 08:51

## 2019-08-03 RX ADMIN — SODIUM CHLORIDE: 9 INJECTION, SOLUTION INTRAVENOUS at 09:23

## 2019-08-03 RX ADMIN — INSULIN LISPRO 8 UNITS: 100 INJECTION, SOLUTION INTRAVENOUS; SUBCUTANEOUS at 12:40

## 2019-08-03 RX ADMIN — INSULIN LISPRO 1 UNITS: 100 INJECTION, SOLUTION INTRAVENOUS; SUBCUTANEOUS at 22:07

## 2019-08-03 RX ADMIN — LINEZOLID 600 MG: 600 INJECTION, SOLUTION INTRAVENOUS at 12:40

## 2019-08-03 RX ADMIN — OXYCODONE HYDROCHLORIDE AND ACETAMINOPHEN 1 TABLET: 5; 325 TABLET ORAL at 12:52

## 2019-08-03 RX ADMIN — INSULIN LISPRO 8 UNITS: 100 INJECTION, SOLUTION INTRAVENOUS; SUBCUTANEOUS at 17:52

## 2019-08-03 RX ADMIN — INSULIN LISPRO 1 UNITS: 100 INJECTION, SOLUTION INTRAVENOUS; SUBCUTANEOUS at 17:51

## 2019-08-03 RX ADMIN — INSULIN GLARGINE 25 UNITS: 100 INJECTION, SOLUTION SUBCUTANEOUS at 22:07

## 2019-08-03 RX ADMIN — OXYCODONE HYDROCHLORIDE AND ACETAMINOPHEN 1 TABLET: 5; 325 TABLET ORAL at 00:44

## 2019-08-03 RX ADMIN — INSULIN LISPRO 1 UNITS: 100 INJECTION, SOLUTION INTRAVENOUS; SUBCUTANEOUS at 08:51

## 2019-08-03 RX ADMIN — LEVOTHYROXINE SODIUM 75 MCG: 75 TABLET ORAL at 06:21

## 2019-08-03 RX ADMIN — SIMVASTATIN 40 MG: 40 TABLET, FILM COATED ORAL at 08:50

## 2019-08-03 RX ADMIN — SODIUM CHLORIDE: 9 INJECTION, SOLUTION INTRAVENOUS at 06:06

## 2019-08-03 RX ADMIN — LINEZOLID 600 MG: 600 INJECTION, SOLUTION INTRAVENOUS at 23:36

## 2019-08-03 RX ADMIN — MORPHINE SULFATE 1 MG: 2 INJECTION, SOLUTION INTRAMUSCULAR; INTRAVENOUS at 07:46

## 2019-08-03 RX ADMIN — OXYCODONE HYDROCHLORIDE AND ACETAMINOPHEN 1 TABLET: 5; 325 TABLET ORAL at 17:51

## 2019-08-03 RX ADMIN — ENOXAPARIN SODIUM 40 MG: 40 INJECTION SUBCUTANEOUS at 08:50

## 2019-08-03 RX ADMIN — ASPIRIN 81 MG: 81 TABLET ORAL at 08:50

## 2019-08-03 RX ADMIN — Medication 10 ML: at 22:07

## 2019-08-03 RX ADMIN — LINEZOLID 600 MG: 600 INJECTION, SOLUTION INTRAVENOUS at 00:44

## 2019-08-03 RX ADMIN — OXYCODONE HYDROCHLORIDE AND ACETAMINOPHEN 1 TABLET: 5; 325 TABLET ORAL at 22:06

## 2019-08-03 RX ADMIN — OXYCODONE HYDROCHLORIDE AND ACETAMINOPHEN 1 TABLET: 5; 325 TABLET ORAL at 08:50

## 2019-08-03 ASSESSMENT — PAIN - FUNCTIONAL ASSESSMENT

## 2019-08-03 ASSESSMENT — PAIN DESCRIPTION - ONSET
ONSET: ON-GOING

## 2019-08-03 ASSESSMENT — PAIN DESCRIPTION - PROGRESSION
CLINICAL_PROGRESSION: NOT CHANGED
CLINICAL_PROGRESSION: GRADUALLY IMPROVING
CLINICAL_PROGRESSION: NOT CHANGED
CLINICAL_PROGRESSION: GRADUALLY WORSENING
CLINICAL_PROGRESSION: NOT CHANGED
CLINICAL_PROGRESSION: NOT CHANGED

## 2019-08-03 ASSESSMENT — PAIN DESCRIPTION - ORIENTATION
ORIENTATION: LEFT

## 2019-08-03 ASSESSMENT — PAIN DESCRIPTION - FREQUENCY
FREQUENCY: CONTINUOUS

## 2019-08-03 ASSESSMENT — PAIN DESCRIPTION - LOCATION
LOCATION: FOOT

## 2019-08-03 ASSESSMENT — PAIN DESCRIPTION - DESCRIPTORS
DESCRIPTORS: THROBBING
DESCRIPTORS: ACHING;THROBBING
DESCRIPTORS: THROBBING
DESCRIPTORS: ACHING;THROBBING

## 2019-08-03 ASSESSMENT — PAIN DESCRIPTION - PAIN TYPE
TYPE: ACUTE PAIN
TYPE: SURGICAL PAIN;ACUTE PAIN
TYPE: ACUTE PAIN

## 2019-08-03 ASSESSMENT — PAIN SCALES - GENERAL
PAINLEVEL_OUTOF10: 7
PAINLEVEL_OUTOF10: 5
PAINLEVEL_OUTOF10: 6
PAINLEVEL_OUTOF10: 7
PAINLEVEL_OUTOF10: 7
PAINLEVEL_OUTOF10: 4
PAINLEVEL_OUTOF10: 4
PAINLEVEL_OUTOF10: 6
PAINLEVEL_OUTOF10: 5

## 2019-08-03 NOTE — PLAN OF CARE
Problem: Falls - Risk of:  Goal: Will remain free from falls  Description  Will remain free from falls  Outcome: Ongoing  Note:   Patient educated on fall prevention. Call light is within reach, bed locked in lowest position, personal items within reach, and bed alarm is on. Will round on patient per unit guidelines. Problem: Falls - Risk of:  Goal: Absence of physical injury  Description  Absence of physical injury  Outcome: Ongoing  Note:   Pt assessed for fall risk and fall precautions put into place. Bed in lowest position and wheels locked, call light within reach. Nonskid footwear in place. Patient educated on appropriate method of transfer and to call for assistance. Problem: Pain:  Goal: Pain level will decrease  Description  Pain level will decrease  Outcome: Ongoing  Note:   Educated patient on pain management. Will assess patients pain level per unit protocol, and provide pain management measures as needed. Problem: Pain:  Goal: Control of acute pain  Description  Control of acute pain  Outcome: Ongoing  Note:   Patient educated on acute pain. Taught patient to use call light to ask for pain medication. PRN pain medication given for acute pain. Will continue to monitor pain per unit protocol. Problem: Pain:  Goal: Control of chronic pain  Description  Control of chronic pain  Outcome: Ongoing  Note:   Patient educated on chronic pain. Taught patient to use call light to ask for pain medication. Will continue to monitor pain per unit protocol. Problem: Infection - Methicillin-Resistant Staphylococcus Aureus Infection:  Goal: Absence of methicillin-resistant Staphylococcus aureus infection  Description  Absence of methicillin-resistant Staphylococcus aureus infection  Outcome: Ongoing  Note:   No increased swelling, redness, or warmth noted in patient's surgical site. Educated patient on signs and symptoms of infection. Will continue to monitor.

## 2019-08-03 NOTE — PROGRESS NOTES
to auscultation, bilaterally without Rales/Wheezes/Rhonchi with good respiratory effort. Heart: Regular rate and rhythm with Normal S1/S2 without murmurs, rubs or gallops, point of maximum impulse non-displaced  Abdomen: Soft, non-tender or non-distended without rigidity or guarding and positive bowel sounds all four quadrants. Extremities: overall very good condition of skin and great pulses. Left foot lateral plantar surface shallow open ulceration with indurated area underneath, associated erythema, and edema spreading to the dorsum of the foot and towards the ankle. Neurologic: Alert and oriented X 3, neurovascularly intact with sensory/motor intact upper extremities/lower extremities, bilaterally. Cranial nerves: II-XII intact, grossly non-focal.  Mental status: Alert, oriented, thought content appropriate. Capillary Refill: Acceptable  < 3 seconds  Peripheral Pulses: +3 Easily felt, not easily obliterated with pressure         Labs:   Recent Labs     08/01/19 0617 08/02/19  0553 08/03/19  0528   WBC 6.4 11.5* 9.1   HGB 12.9 12.0 11.2*   HCT 38.5 36.9 32.9*    320 283     Recent Labs     08/01/19  0617 08/02/19  0553 08/03/19  0528    131* 136   K 4.3 5.1 4.3    95* 100   CO2 27 24 27   BUN 14 15 19   CREATININE 0.8 0.8 0.9   CALCIUM 9.5 9.2 9.2   PHOS 3.7 4.2 3.4     No results for input(s): AST, ALT, BILIDIR, BILITOT, ALKPHOS in the last 72 hours. No results for input(s): INR in the last 72 hours. No results for input(s): Kalyan Gooden in the last 72 hours.     Urinalysis:      Lab Results   Component Value Date    NITRU Negative 03/29/2019    WBCUA 0-2 03/29/2019    BACTERIA Rare 03/29/2019    RBCUA 0-2 03/29/2019    BLOODU Negative 03/29/2019    SPECGRAV 1.015 03/29/2019    GLUCOSEU Negative 03/29/2019       Radiology:  MRI FOOT LEFT WO CONTRAST   Final Result   Addendum 1 of 1   ADDENDUM:   Additional history of the patient is that she recently had an incision    along   the

## 2019-08-03 NOTE — PROGRESS NOTES
QAM AC    linezolid  600 mg Intravenous Q12H    insulin lispro  0-6 Units Subcutaneous TID WC    insulin lispro  0-3 Units Subcutaneous Nightly    levothyroxine  75 mcg Oral Once per day on Sun Tue Thu Sat    levothyroxine  50 mcg Oral Once per day on Mon Wed Fri    aspirin  81 mg Oral Daily    simvastatin  40 mg Oral Daily       Continuous Infusions:   sodium chloride 75 mL/hr at 08/03/19 0923    dextrose         PRN Meds:  sodium chloride flush, oxyCODONE-acetaminophen, aluminum & magnesium hydroxide-simethicone, morphine, calcium carbonate, glucose, dextrose, glucagon (rDNA), dextrose      Assessment:     Left diabetic foot infection  Suspected penetrating injury  Bedside I&D culture positive for MRSA  MRI of the left foot with soft tissue swelling and tissue edema no deep bone infection  Significant left foot swelling and pain  Left foot cellulitis  Diabetes long-standing on therapy  S/p Left foot drainage and OR cx pending too young to intepret    Suspect this will grow out as MRSA as well and previous isolate KRUPA -2 and IV abx adjusted to IV Linezolid       OR cx MRSA noted as expected and would cont IV abx for now and see her response- she is going to OR Monday for possible closure     Labs, Microbiology, Radiology and all the pertinent results from current hospitalization and  care every where were reviewed  by me as a part of the evaluation   Plan:   1. Left foot abscess OR cx MRSA noted   2. OR notes reviewed   3. Cont local care  4. Control DM +   5. IV Linezolid x 600 mg Q 12 HRS  6.last HbA1C @8.2  7. Elevate Left leg    8. OR Monday for closure ? Discussed with patient/Family d/w RN  D/w  at bed side    Thanks for allowing me to participate in your patient's care and please call me with any questions or concerns.     Edgar Sam MD  Infectious Disease  [facility]  Phone: [phone]   Fax : [phone]

## 2019-08-04 LAB
ABO/RH: NORMAL
ALBUMIN SERPL-MCNC: 3.2 G/DL (ref 3.4–5)
ANION GAP SERPL CALCULATED.3IONS-SCNC: 12 MMOL/L (ref 3–16)
ANTIBODY SCREEN: NORMAL
BASOPHILS ABSOLUTE: 0.1 K/UL (ref 0–0.2)
BASOPHILS RELATIVE PERCENT: 0.9 %
BUN BLDV-MCNC: 14 MG/DL (ref 7–20)
CALCIUM SERPL-MCNC: 9 MG/DL (ref 8.3–10.6)
CHLORIDE BLD-SCNC: 101 MMOL/L (ref 99–110)
CO2: 26 MMOL/L (ref 21–32)
CREAT SERPL-MCNC: 0.8 MG/DL (ref 0.6–1.1)
EOSINOPHILS ABSOLUTE: 0.3 K/UL (ref 0–0.6)
EOSINOPHILS RELATIVE PERCENT: 3.5 %
GFR AFRICAN AMERICAN: >60
GFR NON-AFRICAN AMERICAN: >60
GLUCOSE BLD-MCNC: 147 MG/DL (ref 70–99)
GLUCOSE BLD-MCNC: 189 MG/DL (ref 70–99)
GLUCOSE BLD-MCNC: 217 MG/DL (ref 70–99)
GLUCOSE BLD-MCNC: 241 MG/DL (ref 70–99)
GLUCOSE BLD-MCNC: 88 MG/DL (ref 70–99)
GLUCOSE BLD-MCNC: 91 MG/DL (ref 70–99)
HCT VFR BLD CALC: 35.1 % (ref 36–48)
HEMOGLOBIN: 11.8 G/DL (ref 12–16)
LYMPHOCYTES ABSOLUTE: 3.3 K/UL (ref 1–5.1)
LYMPHOCYTES RELATIVE PERCENT: 41.8 %
MCH RBC QN AUTO: 28.4 PG (ref 26–34)
MCHC RBC AUTO-ENTMCNC: 33.6 G/DL (ref 31–36)
MCV RBC AUTO: 84.5 FL (ref 80–100)
MONOCYTES ABSOLUTE: 0.6 K/UL (ref 0–1.3)
MONOCYTES RELATIVE PERCENT: 8 %
NEUTROPHILS ABSOLUTE: 3.6 K/UL (ref 1.7–7.7)
NEUTROPHILS RELATIVE PERCENT: 45.8 %
PDW BLD-RTO: 14 % (ref 12.4–15.4)
PERFORMED ON: ABNORMAL
PERFORMED ON: NORMAL
PERFORMED ON: NORMAL
PHOSPHORUS: 3.7 MG/DL (ref 2.5–4.9)
PLATELET # BLD: 268 K/UL (ref 135–450)
PMV BLD AUTO: 8.4 FL (ref 5–10.5)
POTASSIUM SERPL-SCNC: 4.1 MMOL/L (ref 3.5–5.1)
RBC # BLD: 4.15 M/UL (ref 4–5.2)
SODIUM BLD-SCNC: 139 MMOL/L (ref 136–145)
WBC # BLD: 7.9 K/UL (ref 4–11)

## 2019-08-04 PROCEDURE — 6370000000 HC RX 637 (ALT 250 FOR IP): Performed by: PODIATRIST

## 2019-08-04 PROCEDURE — 1200000000 HC SEMI PRIVATE

## 2019-08-04 PROCEDURE — 6360000002 HC RX W HCPCS: Performed by: PODIATRIST

## 2019-08-04 PROCEDURE — 86900 BLOOD TYPING SEROLOGIC ABO: CPT

## 2019-08-04 PROCEDURE — 99232 SBSQ HOSP IP/OBS MODERATE 35: CPT | Performed by: INTERNAL MEDICINE

## 2019-08-04 PROCEDURE — 86850 RBC ANTIBODY SCREEN: CPT

## 2019-08-04 PROCEDURE — 2580000003 HC RX 258: Performed by: INTERNAL MEDICINE

## 2019-08-04 PROCEDURE — 6370000000 HC RX 637 (ALT 250 FOR IP): Performed by: INTERNAL MEDICINE

## 2019-08-04 PROCEDURE — 86901 BLOOD TYPING SEROLOGIC RH(D): CPT

## 2019-08-04 PROCEDURE — 94760 N-INVAS EAR/PLS OXIMETRY 1: CPT

## 2019-08-04 PROCEDURE — 85025 COMPLETE CBC W/AUTO DIFF WBC: CPT

## 2019-08-04 PROCEDURE — 80069 RENAL FUNCTION PANEL: CPT

## 2019-08-04 PROCEDURE — 2580000003 HC RX 258: Performed by: ANESTHESIOLOGY

## 2019-08-04 PROCEDURE — 36415 COLL VENOUS BLD VENIPUNCTURE: CPT

## 2019-08-04 RX ORDER — SODIUM CHLORIDE 9 MG/ML
INJECTION, SOLUTION INTRAVENOUS CONTINUOUS
Status: DISCONTINUED | OUTPATIENT
Start: 2019-08-04 | End: 2019-08-06 | Stop reason: HOSPADM

## 2019-08-04 RX ORDER — CLOTRIMAZOLE 1 %
CREAM WITH APPLICATOR VAGINAL NIGHTLY
Status: DISCONTINUED | OUTPATIENT
Start: 2019-08-04 | End: 2019-08-06 | Stop reason: HOSPADM

## 2019-08-04 RX ORDER — INSULIN GLARGINE 100 [IU]/ML
15 INJECTION, SOLUTION SUBCUTANEOUS NIGHTLY
Status: DISCONTINUED | OUTPATIENT
Start: 2019-08-04 | End: 2019-08-06 | Stop reason: HOSPADM

## 2019-08-04 RX ADMIN — OXYCODONE HYDROCHLORIDE AND ACETAMINOPHEN 1 TABLET: 5; 325 TABLET ORAL at 14:26

## 2019-08-04 RX ADMIN — INSULIN LISPRO 2 UNITS: 100 INJECTION, SOLUTION INTRAVENOUS; SUBCUTANEOUS at 09:25

## 2019-08-04 RX ADMIN — SODIUM CHLORIDE: 9 INJECTION, SOLUTION INTRAVENOUS at 21:26

## 2019-08-04 RX ADMIN — INSULIN LISPRO 8 UNITS: 100 INJECTION, SOLUTION INTRAVENOUS; SUBCUTANEOUS at 12:45

## 2019-08-04 RX ADMIN — INSULIN LISPRO 8 UNITS: 100 INJECTION, SOLUTION INTRAVENOUS; SUBCUTANEOUS at 17:22

## 2019-08-04 RX ADMIN — CLOTRIMAZOLE: 1 CREAM VAGINAL at 21:26

## 2019-08-04 RX ADMIN — LEVOTHYROXINE SODIUM 75 MCG: 75 TABLET ORAL at 05:45

## 2019-08-04 RX ADMIN — OXYCODONE HYDROCHLORIDE AND ACETAMINOPHEN 1 TABLET: 5; 325 TABLET ORAL at 05:45

## 2019-08-04 RX ADMIN — SIMVASTATIN 40 MG: 40 TABLET, FILM COATED ORAL at 09:25

## 2019-08-04 RX ADMIN — OXYCODONE HYDROCHLORIDE AND ACETAMINOPHEN 1 TABLET: 5; 325 TABLET ORAL at 21:26

## 2019-08-04 RX ADMIN — Medication 10 ML: at 09:47

## 2019-08-04 RX ADMIN — ASPIRIN 81 MG: 81 TABLET ORAL at 09:25

## 2019-08-04 RX ADMIN — PANTOPRAZOLE SODIUM 40 MG: 40 TABLET, DELAYED RELEASE ORAL at 05:45

## 2019-08-04 RX ADMIN — LINEZOLID 600 MG: 600 INJECTION, SOLUTION INTRAVENOUS at 23:57

## 2019-08-04 RX ADMIN — LINEZOLID 600 MG: 600 INJECTION, SOLUTION INTRAVENOUS at 12:45

## 2019-08-04 RX ADMIN — ENOXAPARIN SODIUM 40 MG: 40 INJECTION SUBCUTANEOUS at 09:25

## 2019-08-04 RX ADMIN — INSULIN LISPRO 1 UNITS: 100 INJECTION, SOLUTION INTRAVENOUS; SUBCUTANEOUS at 12:45

## 2019-08-04 RX ADMIN — INSULIN LISPRO 8 UNITS: 100 INJECTION, SOLUTION INTRAVENOUS; SUBCUTANEOUS at 09:26

## 2019-08-04 RX ADMIN — INSULIN LISPRO 1 UNITS: 100 INJECTION, SOLUTION INTRAVENOUS; SUBCUTANEOUS at 17:21

## 2019-08-04 RX ADMIN — OXYCODONE HYDROCHLORIDE AND ACETAMINOPHEN 1 TABLET: 5; 325 TABLET ORAL at 09:46

## 2019-08-04 ASSESSMENT — PAIN DESCRIPTION - FREQUENCY
FREQUENCY: CONTINUOUS

## 2019-08-04 ASSESSMENT — PAIN DESCRIPTION - DESCRIPTORS
DESCRIPTORS: ACHING
DESCRIPTORS: ACHING;CONSTANT
DESCRIPTORS: ACHING;CONSTANT
DESCRIPTORS: ACHING
DESCRIPTORS: ACHING
DESCRIPTORS: ACHING;CONSTANT
DESCRIPTORS: ACHING;CONSTANT
DESCRIPTORS: ACHING

## 2019-08-04 ASSESSMENT — PAIN DESCRIPTION - ONSET
ONSET: ON-GOING

## 2019-08-04 ASSESSMENT — PAIN DESCRIPTION - PAIN TYPE
TYPE: SURGICAL PAIN

## 2019-08-04 ASSESSMENT — PAIN DESCRIPTION - PROGRESSION
CLINICAL_PROGRESSION: GRADUALLY WORSENING
CLINICAL_PROGRESSION: NOT CHANGED
CLINICAL_PROGRESSION: GRADUALLY IMPROVING
CLINICAL_PROGRESSION: NOT CHANGED
CLINICAL_PROGRESSION: NOT CHANGED
CLINICAL_PROGRESSION: GRADUALLY IMPROVING
CLINICAL_PROGRESSION: NOT CHANGED
CLINICAL_PROGRESSION: NOT CHANGED

## 2019-08-04 ASSESSMENT — PAIN SCALES - GENERAL
PAINLEVEL_OUTOF10: 5
PAINLEVEL_OUTOF10: 3
PAINLEVEL_OUTOF10: 5
PAINLEVEL_OUTOF10: 3

## 2019-08-04 ASSESSMENT — PAIN DESCRIPTION - LOCATION
LOCATION: FOOT

## 2019-08-04 ASSESSMENT — PAIN - FUNCTIONAL ASSESSMENT
PAIN_FUNCTIONAL_ASSESSMENT: ACTIVITIES ARE NOT PREVENTED

## 2019-08-04 ASSESSMENT — PAIN DESCRIPTION - ORIENTATION
ORIENTATION: LEFT

## 2019-08-04 NOTE — PROGRESS NOTES
an incision    along   the plantar aspect of her foot and is now having some pus drainage. There    is   a small focal defect in the skin plantar aspect between the 3rd and 4th   metatarsals at the level of the MTP joints. There is an approximately 7 x    6   mm focus within the underlying soft tissues which is slightly hyperintense   compared with the remaining tissue but it is not fluid intensity. Findings   may either represent phlegmonous change or focal edema. Findings were   discussed with Dr. Flakita Eisenberg on July 31, 2019 at approximately 11:55   a.m. .         Final              Assessment/Plan:    Active Hospital Problems    Diagnosis    Cellulitis of left foot [L03.116]       Cellulitis with suspected myositis and possible tendonitis - diabetic foot infection  Wound culture + MRSA. Surgical culture also + MRSA. Plan:               - MRI left foot no OM, no abscess   - s/p OR I&D 8/1/2019 - pus drained and culture grows MRSA - wound packed and left open - plan for staged procedure and closure Monday per podiatry. - initially on Vanc and Levaquin IV - to IV zyvox. - ID and podiatry involved.         DMII uncontrolled due to infection. Cont home regimen of  lantus - presently at 25 - decrease to 15 with NPO after midnight status  prand bolus - cont 8  ISS  Carb control diet.         HLD on statin.         Yeast Vaginitis -   Add topical antifungal.          DVT Prophylaxis: lovenox  Diet: carb control  Code Status: full code        Dispo - inpatient. OR tomorrow. NPO after midnight.  IVF start 2200.        Britta Mendenhall MD

## 2019-08-04 NOTE — PROGRESS NOTES
REMOVAL      right    SINUS SURGERY         Current Medications:    Outpatient Medications Marked as Taking for the 19 encounter [facility] Encounter)   Medication Sig Dispense Refill    insulin lispro (HUMALOG) 100 UNIT/ML injection vial Inject into the skin 3 times daily (before meals) Low dose sliding scale      omeprazole (PRILOSEC) 20 MG delayed release capsule Take 20 mg by mouth Daily      clindamycin (CLEOCIN) 150 MG capsule Take 3 capsules by mouth 3 times daily for 10 days 90 capsule 0    [] oxyCODONE-acetaminophen (PERCOCET) 5-325 MG per tablet Take 1 tablet by mouth every 6 hours as needed for Pain for up to 3 days.  15 tablet 0    ibuprofen (ADVIL;MOTRIN) 800 MG tablet Take 1 tablet by mouth every 8 hours as needed for Pain 30 tablet 0    insulin lispro (HUMALOG) 100 UNIT/ML injection vial 6-10 units AC TID (Patient taking differently: Inject 8 Units into the skin 3 times daily (before meals) 6-10 units AC TID) 1 vial 3    POTASSIUM CHLORIDE PO Take 99 mg by mouth daily       insulin glargine (LANTUS SOLOSTAR) 100 UNIT/ML injection pen 18 units daily (Patient taking differently: Inject 24 Units into the skin nightly 18 units daily) 5 pen 3    Insulin Pen Needle (B-D UF III MINI PEN NEEDLES) 31G X 5 MM MISC 4 times a day 150 each 6    simvastatin (ZOCOR) 40 MG tablet TAKE 1 TABLET DAILY 90 tablet 0    FREESTYLE LITE strip TEST GLUCOSE TWICE A  each 3    levothyroxine (SYNTHROID) 50 MCG tablet TAKE 1 TABLET ON MONDAY, WEDNESDAY AND FRIDAY 180 tablet 1    levothyroxine (SYNTHROID) 75 MCG tablet TAKE 1 TABLET ON TUESDAY, THURSDAY, SATURDAY AND  180 tablet 1    aspirin 81 MG EC tablet Take 1 by mouth every other day         Allergies:  Zithromax [azithromycin]    Immunizations :   Immunization History   Administered Date(s) Administered    Influenza Vaccine, unspecified formulation 10/14/2016    Influenza Whole 2013, 10/01/2013    PPD Test 2016    regular rhythm, normal S1 and S2, no murmurs, rubs, clicks, or gallops, no carotid bruits  Abdomen: soft, non-tender, non-distended, normal bowel sounds, no masses or organomegaly  Extremities: no cyanosis, clubbing or edema  Musculoskeletal: normal range of motion, no joint swelling, deformity or tenderness  Neurologic: reflexes normal and symmetric, no cranial nerve deficit  Psych:  Orientation, sensorium, mood normal  Lines:  IV  Left leg swelling, warmth, left foot plantar ulcer, local cellulitis,   Post op dressing+       Data Review:    Lab Results   Component Value Date    WBC 7.9 08/04/2019    HGB 11.8 (L) 08/04/2019    HCT 35.1 (L) 08/04/2019    MCV 84.5 08/04/2019     08/04/2019     Lab Results   Component Value Date    CREATININE 0.8 08/04/2019    BUN 14 08/04/2019     08/04/2019    K 4.1 08/04/2019     08/04/2019    CO2 26 08/04/2019       Hepatic Function Panel:   Lab Results   Component Value Date    ALKPHOS 66 07/29/2019    ALT 10 07/29/2019    AST 14 07/29/2019    PROT 6.8 07/29/2019    BILITOT <0.2 07/29/2019    BILIDIR <0.2 07/29/2019    IBILI see below 07/29/2019    LABALBU 3.2 08/04/2019       UA:  Lab Results   Component Value Date    COLORU Yellow 03/29/2019    CLARITYU Clear 03/29/2019    GLUCOSEU Negative 03/29/2019    BILIRUBINUR Negative 03/29/2019    BILIRUBINUR negative 09/02/2014    KETUA Negative 03/29/2019    SPECGRAV 1.015 03/29/2019    BLOODU Negative 03/29/2019    PHUR 7.5 03/29/2019    PROTEINU Negative 03/29/2019    UROBILINOGEN 0.2 03/29/2019    NITRU Negative 03/29/2019    LEUKOCYTESUR SMALL 03/29/2019    LABMICR <1.20 06/27/2019    LABMICR YES 03/29/2019    URINETYPE Voided 03/29/2019      Urine Microscopic:   Lab Results   Component Value Date    BACTERIA Rare 03/29/2019    WBCUA 0-2 03/29/2019    RBCUA 0-2 03/29/2019    EPIU 3-5 03/29/2019       MICRO: cultures reviewed and updated by me   19 7680       Order Status: Completed Specimen: Foot Updated: [address] [place] [place] [address]   Phone [phone]   Susceptibility     Staph aureus mrsa (1)     Antibiotic Interpretation KRUPA Status    ceFAZolin Resistant 16 mcg/mL     ciprofloxacin Sensitive <=1 mcg/mL     clindamycin Sensitive <=0.5 mcg/mL     erythromycin Resistant >4 mcg/mL     oxacillin Resistant >2 mcg/mL     tetracycline Sensitive <=4 mcg/mL     trimethoprim-sulfamethoxazole Sensitive <=0.5/9.5 mcg/mL     vancomycin Sensitive 2 mcg/mL              Surgical Culture [[id number]] Collected: 08/01/19 1421   Order Status: Completed Specimen: Specimen from Foot Updated: 08/02/19 0949    Culture Surgical Growth too young. further report to follow    Anaerobic Culture Further report to follow   Narrative:     ORDER#: [id number]                          ORDERED BY: RICK MERCHANT  SOURCE: Foot                               COLLECTED:  08/01/19 14:21  ANTIBIOTICS AT [facility] :                      RECEIVED :  08/01/19 14:50  Performed at:  [facility]  [address] [facility] [email or web address] [email or web address] [address]   Phone [phone]   Surgical Culture [[id number]] Collected: 08/01/19 1421   Order Status: Completed Specimen: Specimen from Foot Updated: 08/02/19 0949    Culture Surgical Growth too young.  further report to follow    Anaerobic Culture Further report to follow   Narrative:     ORDER#: [id number]                          ORDERED BY: RICK MERCHANT  SOURCE: Foot                               COLLECTED:  08/01/19 14:21            Surgical Culture [[id number]] (Abnormal) Collected: 08/01/19 1421   Order Status: Completed Specimen: Specimen from Foot Updated: 08/03/19 0916    Gram Stain Result No Epithelial Cells seen   1+ WBC's (Polymorphonuclear)   1+ Gram positive cocci   Abnormal     Anaerobic Culture Further report to follow    Organism Staph aureus MRSAAbnormal     Culture Surgical --Abnormal     Light growth   No further workup   CONTACT PRECAUTIONS INDICATED   Refer to culture #[id number] for sensitivity results   Abnormal    Narrative:     ORDER#: [id number]                          ORDERED BY: RICK MERCHANT  SOURCE: Foot                               COLLECTED:  08/01/19 14:21  ANTIBIOTICS AT [facility]. :                      RECEIVED :  08/01/19 14:50  Performed at:  [facility]  [address] [facility] [place] [address] [address]   Phone [phone]   Surgical Culture [[id number]] (Abnormal) Collected: 08/01/19 1421   Order Status: Completed Specimen: Specimen from Foot Updated: 08/03/19 0915    Gram Stain Result No WBC's seen   1+ Gram positive cocci   Abnormal     Anaerobic Culture Further report to follow    Organism Staph aureus MRSAAbnormal     Culture Surgical --Abnormal     Light growth   No further workup   CONTACT PRECAUTIONS INDICATED   Refer to culture #[id number] for sensitivity results   Abnormal    Narrative:               IMAGING:    MRI FOOT LEFT WO CONTRAST   Final Result   Addendum 1 of 1   ADDENDUM:   Additional history of the patient is that she recently had an incision    along   the plantar aspect of her foot and is now having some pus drainage. There    is   a small focal defect in the skin plantar aspect between the 3rd and 4th   metatarsals at the level of the MTP joints. There is an approximately 7 x    6   mm focus within the underlying soft tissues which is slightly hyperintense   compared with the remaining tissue but it is not fluid intensity. Findings   may either represent phlegmonous change or focal edema. Findings were   discussed with Dr. Alverto Escobar on July 31, 2019 at approximately 11:55   a.m. .         Final            All the pertinent images and reports for the current Hospitalization were reviewed by me     Scheduled Meds:   insulin glargine  15 Units Subcutaneous Nightly    clotrimazole   Vaginal Nightly    sodium chloride flush  10 mL Intravenous 2 times per day    insulin lispro  8 Units Subcutaneous TID WC

## 2019-08-05 ENCOUNTER — ANESTHESIA (OUTPATIENT)
Dept: OPERATING ROOM | Age: 59
DRG: 982 | End: 2019-08-05
Payer: COMMERCIAL

## 2019-08-05 VITALS
DIASTOLIC BLOOD PRESSURE: 76 MMHG | TEMPERATURE: 96.8 F | OXYGEN SATURATION: 100 % | RESPIRATION RATE: 1 BRPM | SYSTOLIC BLOOD PRESSURE: 150 MMHG

## 2019-08-05 LAB
ALBUMIN SERPL-MCNC: 3.4 G/DL (ref 3.4–5)
ANION GAP SERPL CALCULATED.3IONS-SCNC: 12 MMOL/L (ref 3–16)
BASOPHILS ABSOLUTE: 0 K/UL (ref 0–0.2)
BASOPHILS RELATIVE PERCENT: 0.7 %
BUN BLDV-MCNC: 11 MG/DL (ref 7–20)
CALCIUM SERPL-MCNC: 9 MG/DL (ref 8.3–10.6)
CHLORIDE BLD-SCNC: 97 MMOL/L (ref 99–110)
CO2: 26 MMOL/L (ref 21–32)
CREAT SERPL-MCNC: 0.9 MG/DL (ref 0.6–1.1)
EOSINOPHILS ABSOLUTE: 0.2 K/UL (ref 0–0.6)
EOSINOPHILS RELATIVE PERCENT: 3 %
GFR AFRICAN AMERICAN: >60
GFR NON-AFRICAN AMERICAN: >60
GLUCOSE BLD-MCNC: 173 MG/DL (ref 70–99)
GLUCOSE BLD-MCNC: 201 MG/DL (ref 70–99)
GLUCOSE BLD-MCNC: 234 MG/DL (ref 70–99)
GLUCOSE BLD-MCNC: 266 MG/DL (ref 70–99)
GLUCOSE BLD-MCNC: 328 MG/DL (ref 70–99)
GLUCOSE BLD-MCNC: 380 MG/DL (ref 70–99)
GLUCOSE BLD-MCNC: 407 MG/DL (ref 70–99)
HCT VFR BLD CALC: 36.4 % (ref 36–48)
HEMOGLOBIN: 12.1 G/DL (ref 12–16)
LYMPHOCYTES ABSOLUTE: 2.4 K/UL (ref 1–5.1)
LYMPHOCYTES RELATIVE PERCENT: 31.8 %
MCH RBC QN AUTO: 28.3 PG (ref 26–34)
MCHC RBC AUTO-ENTMCNC: 33.3 G/DL (ref 31–36)
MCV RBC AUTO: 84.9 FL (ref 80–100)
MONOCYTES ABSOLUTE: 0.6 K/UL (ref 0–1.3)
MONOCYTES RELATIVE PERCENT: 7.5 %
NEUTROPHILS ABSOLUTE: 4.2 K/UL (ref 1.7–7.7)
NEUTROPHILS RELATIVE PERCENT: 57 %
PDW BLD-RTO: 13.9 % (ref 12.4–15.4)
PERFORMED ON: ABNORMAL
PHOSPHORUS: 3.4 MG/DL (ref 2.5–4.9)
PLATELET # BLD: 289 K/UL (ref 135–450)
PMV BLD AUTO: 8.5 FL (ref 5–10.5)
POTASSIUM SERPL-SCNC: 4.6 MMOL/L (ref 3.5–5.1)
RBC # BLD: 4.28 M/UL (ref 4–5.2)
SODIUM BLD-SCNC: 135 MMOL/L (ref 136–145)
WBC # BLD: 7.4 K/UL (ref 4–11)

## 2019-08-05 PROCEDURE — 2500000003 HC RX 250 WO HCPCS: Performed by: NURSE ANESTHETIST, CERTIFIED REGISTERED

## 2019-08-05 PROCEDURE — 7100000000 HC PACU RECOVERY - FIRST 15 MIN: Performed by: PODIATRIST

## 2019-08-05 PROCEDURE — 3600000012 HC SURGERY LEVEL 2 ADDTL 15MIN: Performed by: PODIATRIST

## 2019-08-05 PROCEDURE — 99232 SBSQ HOSP IP/OBS MODERATE 35: CPT | Performed by: INTERNAL MEDICINE

## 2019-08-05 PROCEDURE — 6370000000 HC RX 637 (ALT 250 FOR IP): Performed by: ANESTHESIOLOGY

## 2019-08-05 PROCEDURE — 2580000003 HC RX 258: Performed by: NURSE ANESTHETIST, CERTIFIED REGISTERED

## 2019-08-05 PROCEDURE — 3700000001 HC ADD 15 MINUTES (ANESTHESIA): Performed by: PODIATRIST

## 2019-08-05 PROCEDURE — 6370000000 HC RX 637 (ALT 250 FOR IP): Performed by: PODIATRIST

## 2019-08-05 PROCEDURE — 2580000003 HC RX 258: Performed by: PODIATRIST

## 2019-08-05 PROCEDURE — 0YQN0ZZ REPAIR LEFT FOOT, OPEN APPROACH: ICD-10-PCS | Performed by: PODIATRIST

## 2019-08-05 PROCEDURE — 1200000000 HC SEMI PRIVATE

## 2019-08-05 PROCEDURE — 6360000002 HC RX W HCPCS: Performed by: PODIATRIST

## 2019-08-05 PROCEDURE — 2500000003 HC RX 250 WO HCPCS: Performed by: PODIATRIST

## 2019-08-05 PROCEDURE — 3600000002 HC SURGERY LEVEL 2 BASE: Performed by: PODIATRIST

## 2019-08-05 PROCEDURE — 3700000000 HC ANESTHESIA ATTENDED CARE: Performed by: PODIATRIST

## 2019-08-05 PROCEDURE — 2709999900 HC NON-CHARGEABLE SUPPLY: Performed by: PODIATRIST

## 2019-08-05 PROCEDURE — 6360000002 HC RX W HCPCS: Performed by: NURSE ANESTHETIST, CERTIFIED REGISTERED

## 2019-08-05 PROCEDURE — 7100000001 HC PACU RECOVERY - ADDTL 15 MIN: Performed by: PODIATRIST

## 2019-08-05 PROCEDURE — 94760 N-INVAS EAR/PLS OXIMETRY 1: CPT

## 2019-08-05 PROCEDURE — 80069 RENAL FUNCTION PANEL: CPT

## 2019-08-05 PROCEDURE — 85025 COMPLETE CBC W/AUTO DIFF WBC: CPT

## 2019-08-05 PROCEDURE — 36415 COLL VENOUS BLD VENIPUNCTURE: CPT

## 2019-08-05 RX ORDER — MIDAZOLAM HYDROCHLORIDE 1 MG/ML
INJECTION INTRAMUSCULAR; INTRAVENOUS PRN
Status: DISCONTINUED | OUTPATIENT
Start: 2019-08-05 | End: 2019-08-05 | Stop reason: SDUPTHER

## 2019-08-05 RX ORDER — OXYCODONE HYDROCHLORIDE 5 MG/1
10 TABLET ORAL PRN
Status: DISCONTINUED | OUTPATIENT
Start: 2019-08-05 | End: 2019-08-05 | Stop reason: HOSPADM

## 2019-08-05 RX ORDER — SODIUM CHLORIDE 9 MG/ML
INJECTION, SOLUTION INTRAVENOUS CONTINUOUS PRN
Status: DISCONTINUED | OUTPATIENT
Start: 2019-08-05 | End: 2019-08-05 | Stop reason: SDUPTHER

## 2019-08-05 RX ORDER — LIDOCAINE HYDROCHLORIDE 10 MG/ML
INJECTION, SOLUTION EPIDURAL; INFILTRATION; INTRACAUDAL; PERINEURAL
Status: COMPLETED | OUTPATIENT
Start: 2019-08-05 | End: 2019-08-05

## 2019-08-05 RX ORDER — FENTANYL CITRATE 50 UG/ML
50 INJECTION, SOLUTION INTRAMUSCULAR; INTRAVENOUS EVERY 5 MIN PRN
Status: DISCONTINUED | OUTPATIENT
Start: 2019-08-05 | End: 2019-08-05 | Stop reason: HOSPADM

## 2019-08-05 RX ORDER — BUPIVACAINE HYDROCHLORIDE 5 MG/ML
INJECTION, SOLUTION EPIDURAL; INTRACAUDAL
Status: COMPLETED | OUTPATIENT
Start: 2019-08-05 | End: 2019-08-05

## 2019-08-05 RX ORDER — LIDOCAINE HYDROCHLORIDE 20 MG/ML
INJECTION, SOLUTION EPIDURAL; INFILTRATION; INTRACAUDAL; PERINEURAL PRN
Status: DISCONTINUED | OUTPATIENT
Start: 2019-08-05 | End: 2019-08-05 | Stop reason: SDUPTHER

## 2019-08-05 RX ORDER — MORPHINE SULFATE 2 MG/ML
1 INJECTION, SOLUTION INTRAMUSCULAR; INTRAVENOUS EVERY 5 MIN PRN
Status: DISCONTINUED | OUTPATIENT
Start: 2019-08-05 | End: 2019-08-05 | Stop reason: HOSPADM

## 2019-08-05 RX ORDER — ONDANSETRON 2 MG/ML
INJECTION INTRAMUSCULAR; INTRAVENOUS PRN
Status: DISCONTINUED | OUTPATIENT
Start: 2019-08-05 | End: 2019-08-05 | Stop reason: SDUPTHER

## 2019-08-05 RX ORDER — ONDANSETRON 2 MG/ML
4 INJECTION INTRAMUSCULAR; INTRAVENOUS
Status: DISCONTINUED | OUTPATIENT
Start: 2019-08-05 | End: 2019-08-05 | Stop reason: HOSPADM

## 2019-08-05 RX ORDER — LINEZOLID 600 MG/1
600 TABLET, FILM COATED ORAL 2 TIMES DAILY
Qty: 42 TABLET | Refills: 0 | Status: SHIPPED | OUTPATIENT
Start: 2019-08-05 | End: 2019-08-26

## 2019-08-05 RX ORDER — FENTANYL CITRATE 50 UG/ML
INJECTION, SOLUTION INTRAMUSCULAR; INTRAVENOUS PRN
Status: DISCONTINUED | OUTPATIENT
Start: 2019-08-05 | End: 2019-08-05 | Stop reason: SDUPTHER

## 2019-08-05 RX ORDER — FENTANYL CITRATE 50 UG/ML
25 INJECTION, SOLUTION INTRAMUSCULAR; INTRAVENOUS EVERY 5 MIN PRN
Status: DISCONTINUED | OUTPATIENT
Start: 2019-08-05 | End: 2019-08-05 | Stop reason: HOSPADM

## 2019-08-05 RX ORDER — OXYCODONE HYDROCHLORIDE 5 MG/1
5 TABLET ORAL PRN
Status: DISCONTINUED | OUTPATIENT
Start: 2019-08-05 | End: 2019-08-05 | Stop reason: HOSPADM

## 2019-08-05 RX ORDER — PROPOFOL 10 MG/ML
INJECTION, EMULSION INTRAVENOUS PRN
Status: DISCONTINUED | OUTPATIENT
Start: 2019-08-05 | End: 2019-08-05 | Stop reason: SDUPTHER

## 2019-08-05 RX ORDER — MORPHINE SULFATE 2 MG/ML
2 INJECTION, SOLUTION INTRAMUSCULAR; INTRAVENOUS EVERY 5 MIN PRN
Status: DISCONTINUED | OUTPATIENT
Start: 2019-08-05 | End: 2019-08-05 | Stop reason: HOSPADM

## 2019-08-05 RX ADMIN — INSULIN LISPRO 2 UNITS: 100 INJECTION, SOLUTION INTRAVENOUS; SUBCUTANEOUS at 13:36

## 2019-08-05 RX ADMIN — PANTOPRAZOLE SODIUM 40 MG: 40 TABLET, DELAYED RELEASE ORAL at 13:45

## 2019-08-05 RX ADMIN — PROPOFOL 120 MG: 10 INJECTION, EMULSION INTRAVENOUS at 11:46

## 2019-08-05 RX ADMIN — INSULIN LISPRO 1 UNITS: 100 INJECTION, SOLUTION INTRAVENOUS; SUBCUTANEOUS at 22:36

## 2019-08-05 RX ADMIN — PROPOFOL 30 MG: 10 INJECTION, EMULSION INTRAVENOUS at 12:15

## 2019-08-05 RX ADMIN — INSULIN LISPRO 5 UNITS: 100 INJECTION, SOLUTION INTRAVENOUS; SUBCUTANEOUS at 09:22

## 2019-08-05 RX ADMIN — SODIUM CHLORIDE: 9 INJECTION, SOLUTION INTRAVENOUS at 16:27

## 2019-08-05 RX ADMIN — OXYCODONE HYDROCHLORIDE AND ACETAMINOPHEN 1 TABLET: 5; 325 TABLET ORAL at 19:06

## 2019-08-05 RX ADMIN — CLOTRIMAZOLE: 1 CREAM VAGINAL at 21:27

## 2019-08-05 RX ADMIN — INSULIN HUMAN 5 UNITS: 100 INJECTION, SOLUTION PARENTERAL at 11:36

## 2019-08-05 RX ADMIN — INSULIN LISPRO 8 UNITS: 100 INJECTION, SOLUTION INTRAVENOUS; SUBCUTANEOUS at 13:37

## 2019-08-05 RX ADMIN — FENTANYL CITRATE 50 MCG: 50 INJECTION INTRAMUSCULAR; INTRAVENOUS at 11:46

## 2019-08-05 RX ADMIN — INSULIN LISPRO 1 UNITS: 100 INJECTION, SOLUTION INTRAVENOUS; SUBCUTANEOUS at 17:30

## 2019-08-05 RX ADMIN — LINEZOLID 600 MG: 600 INJECTION, SOLUTION INTRAVENOUS at 23:34

## 2019-08-05 RX ADMIN — LIDOCAINE HYDROCHLORIDE 100 MG: 20 INJECTION, SOLUTION EPIDURAL; INFILTRATION; INTRACAUDAL; PERINEURAL at 11:46

## 2019-08-05 RX ADMIN — ONDANSETRON 4 MG: 2 INJECTION INTRAMUSCULAR; INTRAVENOUS at 11:59

## 2019-08-05 RX ADMIN — ENOXAPARIN SODIUM 40 MG: 40 INJECTION SUBCUTANEOUS at 21:27

## 2019-08-05 RX ADMIN — INSULIN LISPRO 8 UNITS: 100 INJECTION, SOLUTION INTRAVENOUS; SUBCUTANEOUS at 17:32

## 2019-08-05 RX ADMIN — LINEZOLID 600 MG: 600 INJECTION, SOLUTION INTRAVENOUS at 13:36

## 2019-08-05 RX ADMIN — MIDAZOLAM 2 MG: 1 INJECTION INTRAMUSCULAR; INTRAVENOUS at 11:40

## 2019-08-05 RX ADMIN — INSULIN GLARGINE 15 UNITS: 100 INJECTION, SOLUTION SUBCUTANEOUS at 22:36

## 2019-08-05 RX ADMIN — PROPOFOL 20 MG: 10 INJECTION, EMULSION INTRAVENOUS at 12:20

## 2019-08-05 RX ADMIN — PROPOFOL 30 MG: 10 INJECTION, EMULSION INTRAVENOUS at 12:03

## 2019-08-05 RX ADMIN — SODIUM CHLORIDE: 9 INJECTION, SOLUTION INTRAVENOUS at 11:40

## 2019-08-05 ASSESSMENT — PAIN DESCRIPTION - ORIENTATION
ORIENTATION: LEFT

## 2019-08-05 ASSESSMENT — PULMONARY FUNCTION TESTS
PIF_VALUE: 1
PIF_VALUE: 0
PIF_VALUE: 0
PIF_VALUE: 19
PIF_VALUE: 1
PIF_VALUE: 1
PIF_VALUE: 0
PIF_VALUE: 1
PIF_VALUE: 0
PIF_VALUE: 0
PIF_VALUE: 1
PIF_VALUE: 0
PIF_VALUE: 0
PIF_VALUE: 1
PIF_VALUE: 0
PIF_VALUE: 1
PIF_VALUE: 0
PIF_VALUE: 6
PIF_VALUE: 1
PIF_VALUE: 0
PIF_VALUE: 18
PIF_VALUE: 0
PIF_VALUE: 1
PIF_VALUE: 0
PIF_VALUE: 1
PIF_VALUE: 0
PIF_VALUE: 1
PIF_VALUE: 0
PIF_VALUE: 1
PIF_VALUE: 0
PIF_VALUE: 1
PIF_VALUE: 0
PIF_VALUE: 0
PIF_VALUE: 1
PIF_VALUE: 1

## 2019-08-05 ASSESSMENT — PAIN DESCRIPTION - DESCRIPTORS
DESCRIPTORS: PRESSURE;THROBBING
DESCRIPTORS: DISCOMFORT
DESCRIPTORS: THROBBING;DULL

## 2019-08-05 ASSESSMENT — PAIN DESCRIPTION - PAIN TYPE
TYPE: SURGICAL PAIN;ACUTE PAIN
TYPE: SURGICAL PAIN;ACUTE PAIN
TYPE: SURGICAL PAIN

## 2019-08-05 ASSESSMENT — PAIN SCALES - GENERAL
PAINLEVEL_OUTOF10: 0
PAINLEVEL_OUTOF10: 0
PAINLEVEL_OUTOF10: 5
PAINLEVEL_OUTOF10: 3
PAINLEVEL_OUTOF10: 3
PAINLEVEL_OUTOF10: 0

## 2019-08-05 ASSESSMENT — PAIN DESCRIPTION - FREQUENCY
FREQUENCY: CONTINUOUS

## 2019-08-05 ASSESSMENT — PAIN DESCRIPTION - PROGRESSION
CLINICAL_PROGRESSION: NOT CHANGED

## 2019-08-05 ASSESSMENT — PAIN DESCRIPTION - ONSET
ONSET: ON-GOING

## 2019-08-05 ASSESSMENT — PAIN DESCRIPTION - LOCATION
LOCATION: FOOT;TOE (COMMENT WHICH ONE)
LOCATION: FOOT
LOCATION: FOOT

## 2019-08-05 ASSESSMENT — PAIN - FUNCTIONAL ASSESSMENT
PAIN_FUNCTIONAL_ASSESSMENT: PREVENTS OR INTERFERES SOME ACTIVE ACTIVITIES AND ADLS
PAIN_FUNCTIONAL_ASSESSMENT: 0-10

## 2019-08-05 ASSESSMENT — ENCOUNTER SYMPTOMS: SHORTNESS OF BREATH: 0

## 2019-08-05 NOTE — PROGRESS NOTES
Patient alert and oriented x 4 in chair watching television. Assessment completed and nightly medications given. Blood sugar 88. Snack provided. Pt denies the need for any pain interventions at this time. Surgical shoe remains on left foot. Pt aware she will ne NPO after midnight for surgery tomorrow. No further needs noted at this time. Fall precautions in place. Bed alarm on. Call light within reach. Will continue to monitor.    Electronically signed by Aris Manrique RN on 8/4/2019 at 8:55 PM

## 2019-08-05 NOTE — ANESTHESIA PRE PROCEDURE
(1.651 m). Weight as of an earlier encounter on 8/5/19: 192 lb (87.1 kg). CBC   Lab Results   Component Value Date    WBC 7.4 08/05/2019    RBC 4.28 08/05/2019    HGB 12.1 08/05/2019    HCT 36.4 08/05/2019    MCV 84.9 08/05/2019    RDW 13.9 08/05/2019     08/05/2019     CMP    Lab Results   Component Value Date     08/05/2019    K 4.6 08/05/2019    K 3.7 03/29/2019    CL 97 08/05/2019    CO2 26 08/05/2019    BUN 11 08/05/2019    CREATININE 0.9 08/05/2019    GFRAA >60 08/05/2019    GFRAA >60 08/25/2012    AGRATIO 1.4 03/29/2019    LABGLOM >60 08/05/2019    LABGLOM 89.3 07/05/2011    GLUCOSE 407 08/05/2019    GLUCOSE 241 07/05/2011    PROT 6.8 07/29/2019    CALCIUM 9.0 08/05/2019    BILITOT <0.2 07/29/2019    ALKPHOS 66 07/29/2019    AST 14 07/29/2019    ALT 10 07/29/2019     BMP    Lab Results   Component Value Date     08/05/2019    K 4.6 08/05/2019    K 3.7 03/29/2019    CL 97 08/05/2019    CO2 26 08/05/2019    BUN 11 08/05/2019    CREATININE 0.9 08/05/2019    CALCIUM 9.0 08/05/2019    GFRAA >60 08/05/2019    GFRAA >60 08/25/2012    LABGLOM >60 08/05/2019    LABGLOM 89.3 07/05/2011    GLUCOSE 407 08/05/2019    GLUCOSE 241 07/05/2011     POCGlucose  Recent Labs     08/03/19  0528 08/04/19  0455 08/05/19  0541   GLUCOSE 250* 241* 407*      Coags    Lab Results   Component Value Date    PROTIME 12.3 07/29/2019    INR 1.08 38/40/2167     HCG (If Applicable) No results found for: PREGTESTUR, PREGSERUM, HCG, HCGQUANT   ABGs No results found for: PHART, PO2ART, UPD2TUB, VUC6SUQ, BEART, N1UCEORI   Type & Screen (If Applicable)  No results found for: LABABO, LABRH                         BMI: Wt Readings from Last 3 Encounters:       NPO Status:                          Anesthesia Evaluation  Patient summary reviewed   history of anesthetic complications: PONV.   Airway: Mallampati: III  TM distance: >3 FB   Neck ROM: full  Mouth opening: > = 3 FB Dental: normal exam         Pulmonary:       (-)

## 2019-08-05 NOTE — PROGRESS NOTES
Pt returned to room 3130 via bed from PACU. A&Ox4, VSS. Denies pain and nausea. Left foot dressing remains D&I. Normal sensation, brisk cap refill, war, to touch, able to wiggle toes. IV site WDL. Call light within reach,  at bedside. Will continue to monitor.   Electronically signed by Sp Tran RN on 8/5/2019 at 1:48 PM

## 2019-08-05 NOTE — PROGRESS NOTES
10/14/2016    Influenza Whole 01/08/2013, 10/01/2013    PPD Test 07/25/2016    Pneumococcal Polysaccharide (Fnixlytop63) 07/26/2017    Tdap (Boostrix, Adacel) 10/25/2013       Social History:   Social History     Tobacco Use    Smoking status: Former Smoker    Smokeless tobacco: Never Used   Substance Use Topics    Alcohol use: No     Alcohol/week: 0.0 standard drinks    Drug use: No     Social History     Tobacco Use   Smoking Status Former Smoker   Smokeless Tobacco Never Used      Family History   Problem Relation Age of Onset    Stroke Maternal Grandmother     Asthma Maternal Grandmother     Stroke Maternal Grandfather         REVIEW OF SYSTEMS:    No fever / chills / sweats. No weight loss. No visual change, eye pain, eye discharge. No oral lesion, sore throat, dysphagia. Denies cough / sputum/Sob   Denies chest pain, palpitations/ dizziness  Denies nausea/ vomiting/abdominal pain/diarrhea. Denies dysuria or change in urinary function. Denies joint swelling or pain. No myalgia, arthralgia. No rashes, skin lesions   Denies focal weakness, sensory change or other neurologic symptoms  No lymph node swelling or tenderness.     Left foot swelling, local redness, pain    PHYSICAL EXAM:      Vitals:    BP (!) 149/77   Pulse 61   Temp 97.9 °F (36.6 °C) (Oral)   Resp 16   Ht 5' 5\" (1.651 m)   Wt 192 lb (87.1 kg)   LMP  (LMP Unknown)   SpO2 98%   BMI 31.95 kg/m²   General Appearance: alert,in no acute distress, +  pallor, no icterus   Skin: warm and dry, no rash or erythema  Head: normocephalic and atraumatic  Eyes: pupils equal, round, and reactive to light, conjunctivae normal  ENT: tympanic membrane, external ear and ear canal normal bilaterally, nose without deformity, nasal mucosa and turbinates normal without polyps  Neck: supple and non-tender without mass, no thyromegaly  no cervical lymphadenopathy  Pulmonary/Chest: clear to auscultation bilaterally- no wheezes, rales or rhonchi, me   19 1832       Order Status: Completed Specimen: Foot Updated: 08/01/19 0858    WOUND/ABSCESS --    Gram Stain Result No WBCs or organisms seen   No Epithelial Cells seen     Organism Staph aureus MRSAAbnormal     WOUND/ABSCESS --Abnormal     Light growth   PBP2= POSITIVE   CONTACT PRECAUTIONS INDICATED   Abnormal    Narrative:     ORDER#: [id number]                          ORDERED BY: Andrew Mckeon  SOURCE: Foot                               COLLECTED:  07/29/19 18:32  [facility]ICS AT [facility]. :                      RECEIVED :  07/29/19 18:36  CALL  [facility]  [facility] tel. [phone],  Microbiology results called to and read back by Larry Waldron RN, 07/31/2019  09:03, by Randi  Performed at:  [facility]  [address] [address] [address]   Phone [phone]   Culture Blood #1 [[id number]] Collected: 07/29/19 1922   Order Status: Completed Specimen: Blood Updated: 07/30/19 2215    Blood Culture, Routine No Growth to date.  Any change in status will be called. Narrative:     ORDER#: [id number]                          ORDERED BY: Andrew Mckeon  SOURCE: Blood Antecubital-Right            COLLECTED:  07/29/19 19:22  [facility]ICS [facility]. :                      RECEIVED :  07/29/19 20:29  If child <=2 yrs old please draw pediatric bottle. ~Blood Culture #1  Performed at:  [facility]  [address] [address]   Phone [phone]   Culture Blood #2 [[id number]] Collected: 07/29/19 1922   Order Status: Completed Specimen: Blood Updated: 07/30/19 2215    Culture, Blood 2 No Growth to date.  Any change in status will be called. Narrative:     ORDER#: [id number]                          ORDERED BY: Andrew Mckeon  SOURCE: Blood Hand, Left                   COLLECTED:  07/29/19 19:22  ANT[facility]ICS AT [facility]. :                      RECEIVED :  07/29/19 20:30  If child <=2 yrs old please draw pediatric bottle. ~Blood growth   No further workup   CONTACT PRECAUTIONS INDICATED   Refer to culture #[id number] for sensitivity results   Abnormal    Narrative:     ORDER#: [id number]                          ORDERED BY: RICK MERCHANT  SOURCE: Foot                               COLLECTED:  08/01/19 14:21  ANT[facility]OTICS AT [facility]. :                      RECEIVED :  08/01/19 14:50  Performed at:  [facility]  [address] [address] [address]   Phone [phone]   Surgical Culture [[id number]] (Abnormal) Collected: 08/01/19 1421   Order Status: Completed Specimen: Specimen from Foot Updated: 08/03/19 0915    Gram Stain Result No WBC's seen   1+ Gram positive cocci   Abnormal     Anaerobic Culture Further report to follow    Organism Staph aureus MRSAAbnormal     Culture Surgical --Abnormal     Light growth   No further workup   CONTACT PRECAUTIONS INDICATED   Refer to culture #[id number] for sensitivity results   Abnormal    Narrative:               IMAGING:    MRI FOOT LEFT WO CONTRAST   Final Result   Addendum 1 of 1   ADDENDUM:   Additional history of the patient is that she recently had an incision    along   the plantar aspect of her foot and is now having some pus drainage. There    is   a small focal defect in the skin plantar aspect between the 3rd and 4th   metatarsals at the level of the MTP joints. There is an approximately 7 x    6   mm focus within the underlying soft tissues which is slightly hyperintense   compared with the remaining tissue but it is not fluid intensity. Findings   may either represent phlegmonous change or focal edema. Findings were   discussed with Dr. Remy Steen on July 31, 2019 at approximately 11:55   a.m. .         Final            All the pertinent images and reports for the current Hospitalization were reviewed by me     Scheduled Meds:   enoxaparin  40 mg Subcutaneous Daily    insulin glargine  15 Units Subcutaneous Nightly    clotrimazole   Vaginal

## 2019-08-05 NOTE — PLAN OF CARE
Problem: Falls - Risk of:  Goal: Will remain free from falls  Description  Will remain free from falls  Outcome: Ongoing  Note:   Fall risk assessment completed. Fall precautions in place. Call light within reach. Pt educated on calling for assistance before getting up. Walkway free of clutter. Will continue to monitor. Problem: Pain:  Goal: Pain level will decrease  Description  Pain level will decrease  Outcome: Ongoing  Note:   Pt assessed for pain. Pt in pain and assessed with 0-10 pain rating scale. Pt given prescribed analgesic for pain. (See eMar) Pt satisfied with pain relief thus far. Will reassess and continue to monitor. Problem: Infection - Methicillin-Resistant Staphylococcus Aureus Infection:  Goal: Absence of methicillin-resistant Staphylococcus aureus infection  Description  Absence of methicillin-resistant Staphylococcus aureus infection  Outcome: Ongoing  Note:   Pt is free of signs and symptoms of infection. Incision and dressing are clean, dry and intact. Vital signs stable. Will monitor.    Contact precautions remain in place

## 2019-08-05 NOTE — PROGRESS NOTES
Nutrition Assessment (Low Risk)    Type and Reason for Visit: Initial    Nutrition Recommendations:   Restart Carb Control diet when appropriate   Will monitor nutritional adequacy, nutrition-related labs, weights, BMs, and clinical progress     Nutrition Assessment:  Patient assessed for nutritional risk. Deemed to be at low risk at this time. Will continue to monitor for changes in status.       Malnutrition Assessment:  · Malnutrition Status: No malnutrition    Nutrition Risk Level   Risk Level: Low    Nutrition Diagnosis:   · Problem: No nutrition diagnosis at this time    Nutrition Intervention:  Food and/or Delivery: Continue NPO(restart diet when appropriate )  Nutrition Education/Counseling/Coordination of Care:  Continued Inpatient Monitoring      Electronically signed by Bertha Echeverria RD, LD on 8/5/19 at 9:40 AM    Contact Number: [phone]

## 2019-08-05 NOTE — PROGRESS NOTES
Pt transported to pre-op via bed. Report called to receiving nurse Ira. Consent in chart, checklist complete, patent IV site in place.  at side.   Electronically signed by Callie Hernandez RN on 8/5/2019 at 10:26 AM

## 2019-08-06 VITALS
RESPIRATION RATE: 18 BRPM | HEIGHT: 65 IN | SYSTOLIC BLOOD PRESSURE: 122 MMHG | HEART RATE: 76 BPM | TEMPERATURE: 97.5 F | BODY MASS INDEX: 31.21 KG/M2 | OXYGEN SATURATION: 95 % | DIASTOLIC BLOOD PRESSURE: 68 MMHG | WEIGHT: 187.3 LBS

## 2019-08-06 LAB
ALBUMIN SERPL-MCNC: 3.3 G/DL (ref 3.4–5)
ANAEROBIC CULTURE: ABNORMAL
ANAEROBIC CULTURE: ABNORMAL
ANION GAP SERPL CALCULATED.3IONS-SCNC: 14 MMOL/L (ref 3–16)
BASOPHILS ABSOLUTE: 0 K/UL (ref 0–0.2)
BASOPHILS RELATIVE PERCENT: 0.2 %
BUN BLDV-MCNC: 15 MG/DL (ref 7–20)
CALCIUM SERPL-MCNC: 8.9 MG/DL (ref 8.3–10.6)
CHLORIDE BLD-SCNC: 96 MMOL/L (ref 99–110)
CO2: 22 MMOL/L (ref 21–32)
CREAT SERPL-MCNC: 0.9 MG/DL (ref 0.6–1.1)
CULTURE SURGICAL: ABNORMAL
CULTURE SURGICAL: ABNORMAL
EOSINOPHILS ABSOLUTE: 0 K/UL (ref 0–0.6)
EOSINOPHILS RELATIVE PERCENT: 0.1 %
GFR AFRICAN AMERICAN: >60
GFR NON-AFRICAN AMERICAN: >60
GLUCOSE BLD-MCNC: 339 MG/DL (ref 70–99)
GLUCOSE BLD-MCNC: 443 MG/DL (ref 70–99)
GLUCOSE BLD-MCNC: 445 MG/DL (ref 70–99)
GRAM STAIN RESULT: ABNORMAL
GRAM STAIN RESULT: ABNORMAL
HCT VFR BLD CALC: 35.1 % (ref 36–48)
HEMOGLOBIN: 11.8 G/DL (ref 12–16)
LYMPHOCYTES ABSOLUTE: 1.5 K/UL (ref 1–5.1)
LYMPHOCYTES RELATIVE PERCENT: 14 %
MCH RBC QN AUTO: 28.6 PG (ref 26–34)
MCHC RBC AUTO-ENTMCNC: 33.6 G/DL (ref 31–36)
MCV RBC AUTO: 85 FL (ref 80–100)
MONOCYTES ABSOLUTE: 0.4 K/UL (ref 0–1.3)
MONOCYTES RELATIVE PERCENT: 4 %
NEUTROPHILS ABSOLUTE: 9 K/UL (ref 1.7–7.7)
NEUTROPHILS RELATIVE PERCENT: 81.7 %
ORGANISM: ABNORMAL
ORGANISM: ABNORMAL
PDW BLD-RTO: 14.1 % (ref 12.4–15.4)
PERFORMED ON: ABNORMAL
PERFORMED ON: ABNORMAL
PHOSPHORUS: 3.4 MG/DL (ref 2.5–4.9)
PLATELET # BLD: 306 K/UL (ref 135–450)
PMV BLD AUTO: 8.3 FL (ref 5–10.5)
POTASSIUM SERPL-SCNC: 4.5 MMOL/L (ref 3.5–5.1)
RBC # BLD: 4.12 M/UL (ref 4–5.2)
SODIUM BLD-SCNC: 132 MMOL/L (ref 136–145)
WBC # BLD: 11 K/UL (ref 4–11)

## 2019-08-06 PROCEDURE — 36415 COLL VENOUS BLD VENIPUNCTURE: CPT

## 2019-08-06 PROCEDURE — 85025 COMPLETE CBC W/AUTO DIFF WBC: CPT

## 2019-08-06 PROCEDURE — 6370000000 HC RX 637 (ALT 250 FOR IP): Performed by: FAMILY MEDICINE

## 2019-08-06 PROCEDURE — 94760 N-INVAS EAR/PLS OXIMETRY 1: CPT

## 2019-08-06 PROCEDURE — 2580000003 HC RX 258: Performed by: PODIATRIST

## 2019-08-06 PROCEDURE — 97116 GAIT TRAINING THERAPY: CPT

## 2019-08-06 PROCEDURE — 6370000000 HC RX 637 (ALT 250 FOR IP): Performed by: PODIATRIST

## 2019-08-06 PROCEDURE — 6360000002 HC RX W HCPCS: Performed by: PODIATRIST

## 2019-08-06 PROCEDURE — 99232 SBSQ HOSP IP/OBS MODERATE 35: CPT | Performed by: INTERNAL MEDICINE

## 2019-08-06 PROCEDURE — 80069 RENAL FUNCTION PANEL: CPT

## 2019-08-06 RX ORDER — OXYCODONE HYDROCHLORIDE AND ACETAMINOPHEN 5; 325 MG/1; MG/1
1 TABLET ORAL EVERY 6 HOURS PRN
Qty: 15 TABLET | Refills: 0 | Status: SHIPPED | OUTPATIENT
Start: 2019-08-06 | End: 2019-08-09

## 2019-08-06 RX ORDER — INSULIN GLARGINE 100 [IU]/ML
15 INJECTION, SOLUTION SUBCUTANEOUS ONCE
Status: COMPLETED | OUTPATIENT
Start: 2019-08-06 | End: 2019-08-06

## 2019-08-06 RX ORDER — CLOTRIMAZOLE 1 %
CREAM WITH APPLICATOR VAGINAL
Qty: 1 TUBE | Refills: 0 | Status: SHIPPED | OUTPATIENT
Start: 2019-08-06 | End: 2019-08-13

## 2019-08-06 RX ADMIN — OXYCODONE HYDROCHLORIDE AND ACETAMINOPHEN 1 TABLET: 5; 325 TABLET ORAL at 12:17

## 2019-08-06 RX ADMIN — OXYCODONE HYDROCHLORIDE AND ACETAMINOPHEN 1 TABLET: 5; 325 TABLET ORAL at 07:52

## 2019-08-06 RX ADMIN — INSULIN LISPRO 8 UNITS: 100 INJECTION, SOLUTION INTRAVENOUS; SUBCUTANEOUS at 12:08

## 2019-08-06 RX ADMIN — INSULIN LISPRO 8 UNITS: 100 INJECTION, SOLUTION INTRAVENOUS; SUBCUTANEOUS at 07:54

## 2019-08-06 RX ADMIN — ASPIRIN 81 MG: 81 TABLET ORAL at 07:52

## 2019-08-06 RX ADMIN — OXYCODONE HYDROCHLORIDE AND ACETAMINOPHEN 1 TABLET: 5; 325 TABLET ORAL at 16:26

## 2019-08-06 RX ADMIN — SIMVASTATIN 40 MG: 40 TABLET, FILM COATED ORAL at 07:52

## 2019-08-06 RX ADMIN — LEVOTHYROXINE SODIUM 75 MCG: 75 TABLET ORAL at 06:37

## 2019-08-06 RX ADMIN — LINEZOLID 600 MG: 600 INJECTION, SOLUTION INTRAVENOUS at 12:09

## 2019-08-06 RX ADMIN — INSULIN GLARGINE 15 UNITS: 100 INJECTION, SOLUTION SUBCUTANEOUS at 09:41

## 2019-08-06 RX ADMIN — PANTOPRAZOLE SODIUM 40 MG: 40 TABLET, DELAYED RELEASE ORAL at 06:37

## 2019-08-06 RX ADMIN — INSULIN LISPRO 6 UNITS: 100 INJECTION, SOLUTION INTRAVENOUS; SUBCUTANEOUS at 07:52

## 2019-08-06 RX ADMIN — OXYCODONE HYDROCHLORIDE AND ACETAMINOPHEN 1 TABLET: 5; 325 TABLET ORAL at 03:31

## 2019-08-06 RX ADMIN — SODIUM CHLORIDE: 9 INJECTION, SOLUTION INTRAVENOUS at 06:37

## 2019-08-06 ASSESSMENT — PAIN SCALES - GENERAL
PAINLEVEL_OUTOF10: 6
PAINLEVEL_OUTOF10: 5
PAINLEVEL_OUTOF10: 6
PAINLEVEL_OUTOF10: 3
PAINLEVEL_OUTOF10: 6
PAINLEVEL_OUTOF10: 0
PAINLEVEL_OUTOF10: 0
PAINLEVEL_OUTOF10: 6

## 2019-08-06 ASSESSMENT — PAIN DESCRIPTION - LOCATION
LOCATION: FOOT

## 2019-08-06 ASSESSMENT — PAIN DESCRIPTION - ORIENTATION
ORIENTATION: LEFT

## 2019-08-06 ASSESSMENT — PAIN DESCRIPTION - DESCRIPTORS
DESCRIPTORS: POUNDING
DESCRIPTORS: DISCOMFORT
DESCRIPTORS: POUNDING;TIGHTNESS
DESCRIPTORS: TIGHTNESS
DESCRIPTORS: DISCOMFORT
DESCRIPTORS: ACHING

## 2019-08-06 ASSESSMENT — PAIN DESCRIPTION - PROGRESSION
CLINICAL_PROGRESSION: GRADUALLY IMPROVING
CLINICAL_PROGRESSION: NOT CHANGED
CLINICAL_PROGRESSION: NOT CHANGED
CLINICAL_PROGRESSION: GRADUALLY IMPROVING
CLINICAL_PROGRESSION: GRADUALLY IMPROVING

## 2019-08-06 ASSESSMENT — PAIN DESCRIPTION - FREQUENCY
FREQUENCY: CONTINUOUS
FREQUENCY: INTERMITTENT
FREQUENCY: INTERMITTENT
FREQUENCY: CONTINUOUS
FREQUENCY: CONTINUOUS

## 2019-08-06 ASSESSMENT — PAIN DESCRIPTION - ONSET
ONSET: ON-GOING

## 2019-08-06 ASSESSMENT — PAIN DESCRIPTION - PAIN TYPE
TYPE: SURGICAL PAIN;ACUTE PAIN
TYPE: SURGICAL PAIN
TYPE: SURGICAL PAIN

## 2019-08-06 NOTE — CARE COORDINATION
8/6 received call from [facility] here at [address] cost is $15.00.   Electronically signed by Sabrina Morales on 8/6/2019 at 8:18 AM

## 2019-08-06 NOTE — PROGRESS NOTES
bilaterally.     Pain with passive ROM of lesser digits 3 and 4 left resolved. IMAGING:  Imaging:  Narrative   EXAMINATION:   MRI OF THE LEFT FOOT WITHOUT CONTRAST, 7/30/2019 11:01 am       TECHNIQUE:   Multiplanar multisequence MRI of the left foot was performed without the   administration of intravenous contrast.       COMPARISON:   None       HISTORY:   ORDERING SYSTEM PROVIDED HISTORY: cellulitis, eval for deep soft tissue/bone   infection. TECHNOLOGIST PROVIDED HISTORY:   Reason for Exam: cellulitis, eval for deep soft tissue/bone infection. Acuity: Acute   Type of Exam: Initial   Additional signs and symptoms: LT foot swelling under toes with sensitivity   to top and bottom of foot.       FINDINGS:   No acute fracture or osteonecrosis.  No evidence of osteomyelitis.       Diffuse soft tissue swelling and edema most prominent dorsal aspect of the   foot.  No fluid collection. .       Intraosseous cyst versus erosion medial 1st metatarsal head.  Small marginal   osteophytes at the 1st MTP joint.  No significant joint effusion.       Flexor and extensor tendons are intact.           Impression   Soft tissue swelling and edema most prominent dorsal aspect of the foot.  No   osteomyelitis.      ADDENDUM:  Additional history of the patient is that she recently had an incision along  the plantar aspect of her foot and is now having some pus drainage. Radha Landon is  a small focal defect in the skin plantar aspect between the 3rd and 4th  metatarsals at the level of the MTP joints.  There is an approximately 7 x 6  mm focus within the underlying soft tissues which is slightly hyperintense  compared with the remaining tissue but it is not fluid intensity.  Findings  may either represent phlegmonous change or focal edema.  Findings were  discussed with Dr. Niharika Marcos on July 31, 2019 at approximately 11:55  a.m. .    ASSESSMENT/PLAN    1. Diabetic foot infection 2/2 puncture wound/?foreign body, left  2.  Deep

## 2019-08-06 NOTE — PROGRESS NOTES
Occupational Therapy  Status Note    Prbahu Stanley  8/6/2019  [id number]    Chart reviewed and OT orders noted. Pt is s/p closure of L foot from I&D on 8/1. Pt evaluated for OT on 8/2 and discharged from acute care services. Per discussion with PT, pt is moving without physical assistance. Pt reports no concerns regarding self-care, homemaking, and mobility at this time.  Will sign off for OT therapy needs - please re-order if concerns arise, otherwise refer to previous OT note for full discharge recommendation - home with prn assist.     Mckenna Murray OTR/L #[id number]

## 2019-08-06 NOTE — PROGRESS NOTES
OR    HYSTERECTOMY  1998    partial    KNEE ARTHROSCOPY      right    OVARY REMOVAL      right    SINUS SURGERY         Current Medications:    Outpatient Medications Marked as Taking for the 19 encounter [facility] Encounter)   Medication Sig Dispense Refill    linezolid (ZYVOX) 600 MG tablet Take 1 tablet by mouth 2 times daily for 21 days 42 tablet 0    insulin lispro (HUMALOG) 100 UNIT/ML injection vial Inject into the skin 3 times daily (before meals) Low dose sliding scale      omeprazole (PRILOSEC) 20 MG delayed release capsule Take 20 mg by mouth Daily      [] clindamycin (CLEOCIN) 150 MG capsule Take 3 capsules by mouth 3 times daily for 10 days 90 capsule 0    [] oxyCODONE-acetaminophen (PERCOCET) 5-325 MG per tablet Take 1 tablet by mouth every 6 hours as needed for Pain for up to 3 days.  15 tablet 0    ibuprofen (ADVIL;MOTRIN) 800 MG tablet Take 1 tablet by mouth every 8 hours as needed for Pain 30 tablet 0    insulin lispro (HUMALOG) 100 UNIT/ML injection vial 6-10 units AC TID (Patient taking differently: Inject 8 Units into the skin 3 times daily (before meals) 6-10 units AC TID) 1 vial 3    POTASSIUM CHLORIDE PO Take 99 mg by mouth daily       insulin glargine (LANTUS SOLOSTAR) 100 UNIT/ML injection pen 18 units daily (Patient taking differently: Inject 24 Units into the skin nightly 18 units daily) 5 pen 3    Insulin Pen Needle (B-D UF III MINI PEN NEEDLES) 31G X 5 MM MISC 4 times a day 150 each 6    simvastatin (ZOCOR) 40 MG tablet TAKE 1 TABLET DAILY 90 tablet 0    FREESTYLE LITE strip TEST GLUCOSE TWICE A  each 3    levothyroxine (SYNTHROID) 50 MCG tablet TAKE 1 TABLET ON MONDAY, WEDNESDAY AND FRIDAY 180 tablet 1    levothyroxine (SYNTHROID) 75 MCG tablet TAKE 1 TABLET ON TUESDAY, THURSDAY, SATURDAY AND  180 tablet 1    aspirin 81 MG EC tablet Take 1 by mouth every other day         Allergies:  Zithromax [azithromycin]    Immunizations : lispro  0-6 Units Subcutaneous Nightly    enoxaparin  40 mg Subcutaneous Daily    insulin glargine  15 Units Subcutaneous Nightly    clotrimazole   Vaginal Nightly    insulin lispro  8 Units Subcutaneous TID WC    pantoprazole  40 mg Oral QAM AC    linezolid  600 mg Intravenous Q12H    levothyroxine  75 mcg Oral Once per day on Sun Tue Thu Sat    levothyroxine  50 mcg Oral Once per day on Mon Wed Fri    aspirin  81 mg Oral Daily    simvastatin  40 mg Oral Daily       Continuous Infusions:   sodium chloride 100 mL/hr at 08/06/19 7479    dextrose         PRN Meds:  oxyCODONE-acetaminophen, aluminum & magnesium hydroxide-simethicone, morphine, calcium carbonate, glucose, dextrose, glucagon (rDNA), dextrose      Assessment:     Left diabetic foot infection  Suspected penetrating injury  Bedside I&D culture positive for MRSA  MRI of the left foot with soft tissue swelling and tissue edema no deep bone infection  Significant left foot swelling and pain  Left foot cellulitis  Diabetes long-standing on therapy  S/p Left foot drainage and OR cx pending too young to intepret    Suspect this will grow out as MRSA as well and previous isolate KRUPA -2 and IV abx adjusted to IV Linezolid       OR cx MRSA noted as expected and would cont IV abx for now and see her response-     S/p wound closure and no deep pockets and will be able to choose oral Linezolid if approved    Labs, Microbiology, Radiology and all the pertinent results from current hospitalization and  care every where were reviewed  by me as a part of the evaluation   Plan:   1. Left foot abscess OR cx MRSA noted   2. OR notes reviewed   3. Cont local care  4. Control DM +   5. IV Linezolid x 600 mg Q 12 HRS as in patient   6.last HbA1C @8.2  7. Elevate Left leg    8. S/p wound closure   9. oral Linezolid x 600 mg x bid oral for d/c planning  SCRIPTS done   10. [address] [address] Forde for d/c side effects d/w pt         Discussed with patient/Family d/w RN d/w     Thanks for allowing me to participate in your patient's care and please call me with any questions or concerns.     Rebecca Gil MD  Infectious Disease  [facility] ([facility]) Physician  Phone: [phone]   Fax : [phone]

## 2019-08-06 NOTE — DISCHARGE SUMMARY
clubbing, cyanosis or edema bilaterally. Full range of motion without deformity. Left foot in ace wrap and gauze, c/d/i. With decreased erythema and inflammation  Skin: Skin color, texture, turgor normal.  No rashes or lesions. Neurologic:  Neurovascularly intact without any focal sensory/motor deficits. Cranial nerves: II-XII intact, grossly non-focal.  Psychiatric:  Alert and oriented, thought content appropriate, normal insight    Consults:     IP CONSULT TO PODIATRY  IP CONSULT TO INFECTIOUS DISEASES  IP CONSULT TO INFECTIOUS DISEASES    Labs: For convenience and continuity at follow-up the following most recent labs are provided:    Lab Results   Component Value Date    WBC 11.0 08/06/2019    HGB 11.8 08/06/2019    HCT 35.1 08/06/2019    MCV 85.0 08/06/2019     08/06/2019     08/06/2019    K 4.5 08/06/2019    K 3.7 03/29/2019    CL 96 08/06/2019    CO2 22 08/06/2019    BUN 15 08/06/2019    CREATININE 0.9 08/06/2019    CALCIUM 8.9 08/06/2019    PHOS 3.4 08/06/2019    ALKPHOS 66 07/29/2019    ALT 10 07/29/2019    AST 14 07/29/2019    BILITOT <0.2 07/29/2019    BILIDIR <0.2 07/29/2019    LABALBU 3.3 08/06/2019    LDLCALC 182 07/26/2017    TRIG 78 07/26/2017     Lab Results   Component Value Date    INR 1.08 07/29/2019       Radiology:  MRI FOOT LEFT WO CONTRAST   Final Result   Addendum 1 of 1   ADDENDUM:   Additional history of the patient is that she recently had an incision    along   the plantar aspect of her foot and is now having some pus drainage. There    is   a small focal defect in the skin plantar aspect between the 3rd and 4th   metatarsals at the level of the MTP joints. There is an approximately 7 x    6   mm focus within the underlying soft tissues which is slightly hyperintense   compared with the remaining tissue but it is not fluid intensity. Findings   may either represent phlegmonous change or focal edema.   Findings were   discussed with Dr. Nakul Dawkins on July 31, 2019

## 2019-08-07 ENCOUNTER — CARE COORDINATION (OUTPATIENT)
Dept: CASE MANAGEMENT | Age: 59
End: 2019-08-07

## 2019-10-02 ENCOUNTER — OFFICE VISIT (OUTPATIENT)
Dept: INFECTIOUS DISEASES | Age: 59
End: 2019-10-02
Payer: COMMERCIAL

## 2019-10-02 VITALS
SYSTOLIC BLOOD PRESSURE: 128 MMHG | HEART RATE: 67 BPM | BODY MASS INDEX: 29.82 KG/M2 | HEIGHT: 65 IN | TEMPERATURE: 97.5 F | WEIGHT: 179 LBS | RESPIRATION RATE: 18 BRPM | OXYGEN SATURATION: 98 % | DIASTOLIC BLOOD PRESSURE: 78 MMHG

## 2019-10-02 DIAGNOSIS — A49.02 MRSA (METHICILLIN RESISTANT STAPHYLOCOCCUS AUREUS) INFECTION: ICD-10-CM

## 2019-10-02 DIAGNOSIS — L02.91 ABSCESS: ICD-10-CM

## 2019-10-02 DIAGNOSIS — E11.628 DIABETIC FOOT INFECTION (HCC): ICD-10-CM

## 2019-10-02 DIAGNOSIS — M79.672 LEFT FOOT PAIN: ICD-10-CM

## 2019-10-02 DIAGNOSIS — L03.116 CELLULITIS OF LEFT FOOT: ICD-10-CM

## 2019-10-02 DIAGNOSIS — L08.9 DIABETIC FOOT INFECTION (HCC): ICD-10-CM

## 2019-10-02 DIAGNOSIS — L03.116 CELLULITIS OF LEFT FOOT: Primary | ICD-10-CM

## 2019-10-02 LAB
ANION GAP SERPL CALCULATED.3IONS-SCNC: 14 MMOL/L (ref 3–16)
BASOPHILS ABSOLUTE: 0.1 K/UL (ref 0–0.2)
BASOPHILS RELATIVE PERCENT: 1.1 %
BUN BLDV-MCNC: 13 MG/DL (ref 7–20)
C-REACTIVE PROTEIN: 4.4 MG/L (ref 0–5.1)
CALCIUM SERPL-MCNC: 9.8 MG/DL (ref 8.3–10.6)
CHLORIDE BLD-SCNC: 100 MMOL/L (ref 99–110)
CO2: 26 MMOL/L (ref 21–32)
CREAT SERPL-MCNC: 0.8 MG/DL (ref 0.6–1.1)
EOSINOPHILS ABSOLUTE: 0.2 K/UL (ref 0–0.6)
EOSINOPHILS RELATIVE PERCENT: 2.1 %
GFR AFRICAN AMERICAN: >60
GFR NON-AFRICAN AMERICAN: >60
GLUCOSE BLD-MCNC: 100 MG/DL (ref 70–99)
HCT VFR BLD CALC: 36.8 % (ref 36–48)
HEMOGLOBIN: 12.2 G/DL (ref 12–16)
LYMPHOCYTES ABSOLUTE: 2.5 K/UL (ref 1–5.1)
LYMPHOCYTES RELATIVE PERCENT: 28.9 %
MCH RBC QN AUTO: 28.5 PG (ref 26–34)
MCHC RBC AUTO-ENTMCNC: 33.3 G/DL (ref 31–36)
MCV RBC AUTO: 85.8 FL (ref 80–100)
MONOCYTES ABSOLUTE: 0.6 K/UL (ref 0–1.3)
MONOCYTES RELATIVE PERCENT: 6.7 %
NEUTROPHILS ABSOLUTE: 5.2 K/UL (ref 1.7–7.7)
NEUTROPHILS RELATIVE PERCENT: 61.2 %
PDW BLD-RTO: 15.8 % (ref 12.4–15.4)
PLATELET # BLD: 371 K/UL (ref 135–450)
PMV BLD AUTO: 8.8 FL (ref 5–10.5)
POTASSIUM SERPL-SCNC: 5.1 MMOL/L (ref 3.5–5.1)
RBC # BLD: 4.29 M/UL (ref 4–5.2)
SEDIMENTATION RATE, ERYTHROCYTE: 21 MM/HR (ref 0–30)
SODIUM BLD-SCNC: 140 MMOL/L (ref 136–145)
WBC # BLD: 8.5 K/UL (ref 4–11)

## 2019-10-02 PROCEDURE — 99213 OFFICE O/P EST LOW 20 MIN: CPT | Performed by: INTERNAL MEDICINE

## 2019-10-02 RX ORDER — SULFAMETHOXAZOLE AND TRIMETHOPRIM 800; 160 MG/1; MG/1
1 TABLET ORAL 2 TIMES DAILY
Qty: 60 TABLET | Refills: 1 | Status: SHIPPED | OUTPATIENT
Start: 2019-10-02 | End: 2019-11-01

## 2019-10-03 ENCOUNTER — TELEPHONE (OUTPATIENT)
Dept: INFECTIOUS DISEASES | Age: 59
End: 2019-10-03

## 2019-10-17 ENCOUNTER — OFFICE VISIT (OUTPATIENT)
Dept: ENDOCRINOLOGY | Age: 59
End: 2019-10-17
Payer: COMMERCIAL

## 2019-10-17 ENCOUNTER — HOSPITAL ENCOUNTER (OUTPATIENT)
Age: 59
Discharge: HOME | End: 2019-10-17
Payer: COMMERCIAL

## 2019-10-17 VITALS
DIASTOLIC BLOOD PRESSURE: 70 MMHG | HEIGHT: 65 IN | OXYGEN SATURATION: 98 % | WEIGHT: 183.6 LBS | SYSTOLIC BLOOD PRESSURE: 138 MMHG | HEART RATE: 60 BPM | BODY MASS INDEX: 30.59 KG/M2

## 2019-10-17 DIAGNOSIS — E03.9 ACQUIRED HYPOTHYROIDISM: ICD-10-CM

## 2019-10-17 LAB
CREATININE URINE: 32.5 MG/DL (ref 28–259)
MICROALBUMIN UR-MCNC: <1.2 MG/DL
MICROALBUMIN/CREAT UR-RTO: NORMAL MG/G (ref 0–30)

## 2019-10-17 PROCEDURE — 83036 HEMOGLOBIN GLYCOSYLATED A1C: CPT

## 2019-10-17 PROCEDURE — 82570 ASSAY OF URINE CREATININE: CPT

## 2019-10-17 PROCEDURE — 82043 UR ALBUMIN QUANTITATIVE: CPT

## 2019-10-17 PROCEDURE — 36415 COLL VENOUS BLD VENIPUNCTURE: CPT

## 2019-10-17 PROCEDURE — 99214 OFFICE O/P EST MOD 30 MIN: CPT | Performed by: INTERNAL MEDICINE

## 2019-10-17 RX ORDER — FLASH GLUCOSE SCANNING READER
EACH MISCELLANEOUS
Qty: 1 DEVICE | Refills: 0 | Status: SHIPPED | OUTPATIENT
Start: 2019-10-17

## 2019-10-17 RX ORDER — FLASH GLUCOSE SENSOR
KIT MISCELLANEOUS
Qty: 2 EACH | Refills: 1 | Status: SHIPPED | OUTPATIENT
Start: 2019-10-17 | End: 2019-12-20 | Stop reason: SDUPTHER

## 2019-10-18 LAB
ESTIMATED AVERAGE GLUCOSE: 168.6 MG/DL
HBA1C MFR BLD: 7.5 %

## 2019-10-23 ENCOUNTER — OFFICE VISIT (OUTPATIENT)
Dept: INFECTIOUS DISEASES | Age: 59
End: 2019-10-23
Payer: COMMERCIAL

## 2019-10-23 VITALS
HEIGHT: 65 IN | SYSTOLIC BLOOD PRESSURE: 124 MMHG | OXYGEN SATURATION: 96 % | WEIGHT: 181 LBS | RESPIRATION RATE: 18 BRPM | BODY MASS INDEX: 30.16 KG/M2 | HEART RATE: 78 BPM | DIASTOLIC BLOOD PRESSURE: 72 MMHG

## 2019-10-23 DIAGNOSIS — L08.9 DIABETIC FOOT INFECTION (HCC): ICD-10-CM

## 2019-10-23 DIAGNOSIS — E11.628 DIABETIC FOOT INFECTION (HCC): ICD-10-CM

## 2019-10-23 DIAGNOSIS — A49.02 MRSA (METHICILLIN RESISTANT STAPHYLOCOCCUS AUREUS) INFECTION: ICD-10-CM

## 2019-10-23 DIAGNOSIS — L03.116 CELLULITIS OF LEFT FOOT: Primary | ICD-10-CM

## 2019-10-23 PROCEDURE — 99212 OFFICE O/P EST SF 10 MIN: CPT | Performed by: INTERNAL MEDICINE

## 2019-11-22 ENCOUNTER — TELEPHONE (OUTPATIENT)
Dept: ENDOCRINOLOGY | Age: 59
End: 2019-11-22

## 2019-11-22 RX ORDER — INSULIN LISPRO 100 [IU]/ML
INJECTION, SOLUTION INTRAVENOUS; SUBCUTANEOUS
Qty: 30 ML | Refills: 1 | Status: SHIPPED | OUTPATIENT
Start: 2019-11-22 | End: 2019-11-22 | Stop reason: SDUPTHER

## 2019-11-22 RX ORDER — INSULIN LISPRO 100 [IU]/ML
INJECTION, SOLUTION INTRAVENOUS; SUBCUTANEOUS
Qty: 30 ML | Refills: 1 | Status: SHIPPED | OUTPATIENT
Start: 2019-11-22 | End: 2020-06-29 | Stop reason: SDUPTHER

## 2019-12-20 ENCOUNTER — TELEPHONE (OUTPATIENT)
Dept: ENDOCRINOLOGY | Age: 59
End: 2019-12-20

## 2019-12-20 RX ORDER — FLASH GLUCOSE SENSOR
KIT MISCELLANEOUS
Qty: 2 EACH | Refills: 1 | Status: SHIPPED | OUTPATIENT
Start: 2019-12-20 | End: 2019-12-23 | Stop reason: SDUPTHER

## 2019-12-20 RX ORDER — INSULIN LISPRO 100 [IU]/ML
INJECTION, SOLUTION INTRAVENOUS; SUBCUTANEOUS
Qty: 10 PEN | Refills: 2 | Status: SHIPPED | OUTPATIENT
Start: 2019-12-20

## 2019-12-23 RX ORDER — FLASH GLUCOSE SENSOR
KIT MISCELLANEOUS
Qty: 6 EACH | Refills: 1 | Status: SHIPPED | OUTPATIENT
Start: 2019-12-23 | End: 2020-06-29 | Stop reason: SDUPTHER

## 2020-06-29 ENCOUNTER — TELEPHONE (OUTPATIENT)
Dept: ENDOCRINOLOGY | Age: 60
End: 2020-06-29

## 2020-06-29 RX ORDER — FLASH GLUCOSE SENSOR
KIT MISCELLANEOUS
Qty: 2 EACH | Refills: 0 | Status: SHIPPED | OUTPATIENT
Start: 2020-06-29

## 2020-06-29 RX ORDER — INSULIN LISPRO 100 [IU]/ML
INJECTION, SOLUTION INTRAVENOUS; SUBCUTANEOUS
Qty: 4 PEN | Refills: 0 | Status: SHIPPED | OUTPATIENT
Start: 2020-06-29

## 2020-07-02 NOTE — TELEPHONE ENCOUNTER
Patient called upset about the script only being sent for 30 days and not 90. I told her she needed to make an appt due to her not being seen sing 10/19 and she said she did hear from anyone and has been fighting this for the 90 days upset no one called her regarding this.  As I asked her if she wanted to make an appt she hung up the phone

## 2020-07-05 ENCOUNTER — APPOINTMENT (OUTPATIENT)
Dept: CT IMAGING | Age: 60
End: 2020-07-05
Payer: COMMERCIAL

## 2020-07-05 ENCOUNTER — HOSPITAL ENCOUNTER (EMERGENCY)
Age: 60
Discharge: HOME | End: 2020-07-06
Attending: EMERGENCY MEDICINE
Payer: COMMERCIAL

## 2020-07-05 PROCEDURE — 70450 CT HEAD/BRAIN W/O DYE: CPT

## 2020-07-05 PROCEDURE — 99283 EMERGENCY DEPT VISIT LOW MDM: CPT

## 2020-07-05 PROCEDURE — 30901 CONTROL OF NOSEBLEED: CPT

## 2020-07-05 PROCEDURE — 6370000000 HC RX 637 (ALT 250 FOR IP): Performed by: EMERGENCY MEDICINE

## 2020-07-05 RX ORDER — OXYMETAZOLINE HYDROCHLORIDE 0.05 G/100ML
2 SPRAY NASAL ONCE
Status: COMPLETED | OUTPATIENT
Start: 2020-07-05 | End: 2020-07-05

## 2020-07-05 RX ADMIN — OXYMETAZOLINE HYDROCHLORIDE 2 SPRAY: 0.05 SPRAY NASAL at 23:30

## 2020-07-05 ASSESSMENT — PAIN SCALES - GENERAL
PAINLEVEL_OUTOF10: 0
PAINLEVEL_OUTOF10: 0

## 2020-07-06 VITALS
TEMPERATURE: 98.2 F | WEIGHT: 192.02 LBS | OXYGEN SATURATION: 96 % | RESPIRATION RATE: 18 BRPM | DIASTOLIC BLOOD PRESSURE: 75 MMHG | HEART RATE: 84 BPM | HEIGHT: 65 IN | BODY MASS INDEX: 31.99 KG/M2 | SYSTOLIC BLOOD PRESSURE: 161 MMHG

## 2020-07-06 PROCEDURE — 6370000000 HC RX 637 (ALT 250 FOR IP): Performed by: EMERGENCY MEDICINE

## 2020-07-06 RX ORDER — HYDROCODONE BITARTRATE AND ACETAMINOPHEN 5; 325 MG/1; MG/1
1 TABLET ORAL EVERY 4 HOURS PRN
Qty: 11 TABLET | Refills: 0 | Status: SHIPPED | OUTPATIENT
Start: 2020-07-06 | End: 2020-07-09

## 2020-07-06 RX ORDER — AMOXICILLIN AND CLAVULANATE POTASSIUM 875; 125 MG/1; MG/1
1 TABLET, FILM COATED ORAL ONCE
Status: COMPLETED | OUTPATIENT
Start: 2020-07-06 | End: 2020-07-06

## 2020-07-06 RX ORDER — AMOXICILLIN AND CLAVULANATE POTASSIUM 875; 125 MG/1; MG/1
1 TABLET, FILM COATED ORAL 2 TIMES DAILY
Qty: 20 TABLET | Refills: 0 | Status: SHIPPED | OUTPATIENT
Start: 2020-07-06 | End: 2020-07-16

## 2020-07-06 RX ADMIN — AMOXICILLIN AND CLAVULANATE POTASSIUM 1 TABLET: 875; 125 TABLET, FILM COATED ORAL at 00:25

## 2020-07-06 ASSESSMENT — ENCOUNTER SYMPTOMS
EYE PAIN: 0
COUGH: 0
EYE ITCHING: 0
STRIDOR: 0
EYE REDNESS: 0
CHEST TIGHTNESS: 0
EYE DISCHARGE: 0
PHOTOPHOBIA: 0
SHORTNESS OF BREATH: 0
CHOKING: 0
ABDOMINAL DISTENTION: 0
APNEA: 0
WHEEZING: 0

## 2020-07-06 ASSESSMENT — PAIN SCALES - GENERAL: PAINLEVEL_OUTOF10: 0

## 2020-07-06 NOTE — ED NOTES
Bed: A-15  Expected date:   Expected time:   Means of arrival: [facility]  Comments:  emiliano Finnegan RN  07/05/20 0511

## 2020-07-06 NOTE — ED NOTES
CRISTAL and MD Renzo Banks at bedside rhino rocket inserted left nasre by MD. Pt tolerated well.       Renzo Paulson RN  07/06/20 3133

## 2020-07-06 NOTE — ED PROVIDER NOTES
[address] [facility]      Pt Name: Yuliet Denson  MRN: [id number]  [place] 1960  Date of evaluation: 7/5/2020  Provider: Kota Srivastava MD    CHIEF COMPLAINT       Chief Complaint   Patient presents with    Epistaxis     4x times this week. started around 2130 tonight while bending over. Pt denies HA, dizziness or blurred vision       HISTORY OF PRESENT ILLNESS    Yuliet Denson is a 64 y.o. female who presents to the emergency department with Nosebleed. Patient presents with one week history intermittent nosebleed in left nares. Not on AC. Endorses allergies that may have caused bleed. Denies trauma. States bleeding quantity significant. Has happened before. Context while bending over. No other associated symptoms. Nursing Notes were reviewed. Including nursing noted for FM, Surgical History, Past Medical History, Social History, vitals, and allergies; agree with all. REVIEW OF SYSTEMS       Review of Systems   Constitutional: Negative for activity change, appetite change, chills, diaphoresis, fatigue, fever and unexpected weight change. HENT: Positive for nosebleeds. Negative for congestion, dental problem, drooling and ear discharge. Eyes: Negative for photophobia, pain, discharge, redness and itching. Respiratory: Negative for apnea, cough, choking, chest tightness, shortness of breath, wheezing and stridor. Cardiovascular: Negative for chest pain and leg swelling. Gastrointestinal: Negative for abdominal distention. Endocrine: Negative for polyphagia and polyuria. Genitourinary: Negative for vaginal bleeding, vaginal discharge and vaginal pain. Musculoskeletal: Negative for arthralgias. Neurological: Negative for dizziness, facial asymmetry and headaches. Hematological: Negative for adenopathy. Does not bruise/bleed easily.    Psychiatric/Behavioral: Negative for agitation, behavioral problems, confusion, decreased concentration, dysphoric mood, hallucinations, self-injury, sleep disturbance and suicidal ideas. The patient is not nervous/anxious and is not hyperactive. Except as noted above the remainder of the review of systems was reviewed and negative.      PAST MEDICAL HISTORY     Past Medical History:   Diagnosis Date    Diabetes mellitus type 1 ([address]     Hyperlipidemia     MRSA (methicillin resistant staph aureus) culture positive 07/29/2019    foot    Thyroid disease        SURGICAL HISTORY       Past Surgical History:   Procedure Laterality Date    BLADDER SUSPENSION      CHOLECYSTECTOMY  october 1991    FOOT DEBRIDEMENT Left 8/1/2019    INCISION AND DRAINAGE DOWN TO AND INCLUDING MUSCLE LEFT FOOT, STAGED PROCEDURE performed by Asa Record, DPM at [address] [place] [address],[facility] Left 8/5/2019    DELAYED PRIMARY CLOSURE LEFT FOOT performed by Asa Pam DPM at [place] [address]  november 1998    partial    KNEE ARTHROSCOPY      right    OVARY REMOVAL      right    SINUS SURGERY         CURRENT MEDICATIONS       Discharge Medication List as of 7/6/2020 12:41 AM      CONTINUE these medications which have NOT CHANGED    Details   !! insulin lispro, 1 Unit Dial, (HUMALOG KWIKPEN) 100 UNIT/ML SOPN 8-16 units AC TID, Disp-4 pen, R-0Normal      Continuous Blood Gluc Sensor (FREESTYLE JOCY 14 DAY SENSOR) MISC Every 2 weeks, Disp-2 each, R-0Normal      !! insulin glargine (LANTUS SOLOSTAR) 100 UNIT/ML injection pen 28 units daily, Disp-6 pen, R-3Normal      !! insulin lispro, 1 Unit Dial, (HUMALOG KWIKPEN) 100 UNIT/ML SOPN 8 units AC TID, Disp-10 pen, R-2Normal      !! insulin glargine (LANTUS SOLOSTAR) 100 UNIT/ML injection pen INJECT UNDER THE SKIN 18 UNITS DAILY, Disp-1 pen, R-3Normal      Continuous Blood Gluc  (FREESTYLE JOCY 14 DAY READER) BECCA Every 2 weeks, Disp-1 Device, R-0Normal      omeprazole (PRILOSEC) 20 MG delayed release capsule Take 20 mg by mouth DailyHistorical Med      ibuprofen (ADVIL;MOTRIN) 800 MG tablet Take 1 tablet by mouth every 8 hours as needed for Pain, Disp-30 tablet, R-0Print      Insulin Pen Needle (B-D UF III MINI PEN NEEDLES) 31G X 5 MM MISC Disp-150 each, R-6, Normal4 times a day      simvastatin (ZOCOR) 40 MG tablet TAKE 1 TABLET DAILY, Disp-90 tablet, R-0Normal      FREESTYLE LITE strip TEST GLUCOSE TWICE A DAY, Disp-200 each, R-3Normal      !! levothyroxine (SYNTHROID) 50 MCG tablet TAKE 1 TABLET ON MONDAY, WEDNESDAY AND FRIDAY, Disp-180 tablet, R-1Normal      !! levothyroxine (SYNTHROID) 75 MCG tablet TAKE 1 TABLET ON TUESDAY, THURSDAY, SATURDAY AND SUNDAY, Disp-180 tablet, R-1Normal      aspirin 81 MG EC tablet Take 1 by mouth every other day       !! - Potential duplicate medications found. Please discuss with provider.           ALLERGIES     Zithromax [azithromycin]    FAMILY HISTORY        Family History   Problem Relation Age of Onset    Stroke Maternal Grandmother     Asthma Maternal Grandmother     Stroke Maternal Grandfather        SOCIAL HISTORY       Social History     Socioeconomic History    Marital status:      Spouse name: None    Number of children: None    Years of education: None    Highest education level: None   Occupational History    None   Social Needs    Financial resource strain: None    Food insecurity     Worry: None     Inability: None    Transportation needs     Medical: None     Non-medical: None   Tobacco Use    Smoking status: Former Smoker    Smokeless tobacco: Never Used   Substance and Sexual Activity    Alcohol use: No     Alcohol/week: 0.0 standard drinks    Drug use: No    Sexual activity: None   Lifestyle    Physical activity     Days per week: None     Minutes per session: None    Stress: None   Relationships    Social connections     Talks on phone: None     Gets together: None     Attends Catholic service: None     Active member of club or organization: None     Attends meetings of clubs or organizations: None     Relationship status: None    Intimate partner violence     Fear of current or ex partner: None     Emotionally abused: None     Physically abused: None     Forced sexual activity: None   Other Topics Concern    None   Social History Narrative    None       PHYSICAL EXAM       ED Triage Vitals [07/05/20 2244]   BP Temp Temp Source Pulse Resp SpO2 Height Weight   (!) 164/87 98.2 °F (36.8 °C) Oral 91 16 96 % 5' 5\" (1.651 m) 192 lb 0.3 oz (87.1 kg)       Physical Exam  Vitals signs and nursing note reviewed. Constitutional:       General: She is not in acute distress. Appearance: She is well-developed. She is not ill-appearing, toxic-appearing or diaphoretic. HENT:      Head: Normocephalic and atraumatic. Right Ear: External ear normal.      Left Ear: External ear normal.      Nose:      Comments: Bleeding from left nares  Eyes:      General:         Right eye: No discharge. Left eye: No discharge. Conjunctiva/sclera: Conjunctivae normal.      Pupils: Pupils are equal, round, and reactive to light. Neck:      Musculoskeletal: Normal range of motion and neck supple. Cardiovascular:      Rate and Rhythm: Normal rate and regular rhythm. Heart sounds: No murmur. Pulmonary:      Effort: Pulmonary effort is normal. No respiratory distress. Breath sounds: Normal breath sounds. No wheezing or rales. Abdominal:      General: Bowel sounds are normal. There is no distension. Palpations: Abdomen is soft. There is no mass. Tenderness: There is no abdominal tenderness. There is no guarding or rebound. Genitourinary:     Comments: Deferred  Musculoskeletal: Normal range of motion. General: No deformity. Skin:     General: Skin is warm. Findings: No erythema or rash. Neurological:      Mental Status: She is alert and oriented to person, place, and time. She is not disoriented.       Cranial Nerves: No cranial nerve deficit. Motor: No atrophy or abnormal muscle tone. Coordination: Coordination normal.   Psychiatric:         Behavior: Behavior normal.         Thought Content: Thought content normal.           DIAGNOSTIC RESULTS     EKG: All EKG's are interpreted by the Emergency Department Physician who either signs or Co-signs this chart in the absence of acardiologist.    None    RADIOLOGY:   Non-plain film images such as CT, Ultrasoundand MRI are read by the radiologist. Plain radiographic images are visualized and preliminarily interpreted by the emergency physician with the below findings:    Impression   No acute intracranial abnormality.       Fluid and blood products noted within the left nasal cavity and left   maxillary sinus.         ED BEDSIDE ULTRASOUND:   Performed by ED Physician - none    LABS:  Labs Reviewed - No data to display    All other labs were withinnormal range or not returned as of this dictation. EMERGENCY DEPARTMENT COURSE and DIFFERENTIAL DIAGNOSIS/MDM:     PMH, Surgical Hx, FH, Social Hx reviewed by myself (ETOH usage, Tobacco usage, Drug usage reviewed by myself, no pertinent Hx)- No Pertinent Hx     Old records were reviewed by me    Bleeding stopped in ER. Discussed with patient if she would like to stay to be seen by ENT in the morning and be admitted. She would prefer to go home and follow up. Augmentin given. Close follow up. I estimate there is LOW risk for Sepsis, MI, Stroke, Tamponade, PTX, Toxicity or other life threatening etiology thus I consider the discharge disposition reasonable. The patient is at low risk for mortality based on demographic, history and clinical factors. Given the best available information and clinical assessment, I estimate the risk of hospitalization to be greater than risk of treatment at home. I have explained to the patient that the risk could rapidly change, given precautions for return and instructions.  Explained to patient that the risk for mortality is low based on demographic, history and clinical factors. I discussed with patient the results of evaluation in the ED, diagnosis, care, and prognosis. The plan is to discharge to home. Patient is in agreement with plan and questions have been answered. I also discussed with patient the reasons which may require a return visit and the importance of follow-up care. The patient is well-appearing, nontoxic, and improved at the time of discharge. Patient agrees to call to arrange follow-up care as directed. Patient understands to return immediately for worsening/change in symptoms. CRITICAL CARE TIME   Total Critical Caretime was 22 minutes, excluding separately reportable procedures. There was a high probability of clinically significant/life threatening deterioration in the patient's condition which required my urgent intervention. PROCEDURES:  Epistaxis Mgmt  Date/Time: 7/6/2020 1:04 AM  Performed by: Ramona Leyva MD  Authorized by: Ramona Leyva MD     Consent:     Consent obtained:  Verbal    Consent given by:  Patient    Risks discussed:  Bleeding, infection, nasal injury and pain    Alternatives discussed:  No treatment, delayed treatment, alternative treatment and observation  Procedure details:     Treatment method:  Anterior pack    Treatment complexity:  Extensive    Treatment episode: initial    Post-procedure details:     Assessment:  Bleeding stopped    Patient tolerance of procedure:   Tolerated well, no immediate complications          FINAL IMPRESSION      1. Epistaxis          DISPOSITION/PLAN   DISPOSITION Decision To Discharge 07/06/2020 12:19:27 AM    PATIENT REFERRED TO:  MYESHA Bobo [address]  [address] [facility] [address]  [phone]    Call today        DISCHARGE MEDICATIONS:  Discharge Medication List as of 7/6/2020 12:41 AM      START taking these medications    Details   amoxicillin-clavulanate (AUGMENTIN) 875-125 MG per tablet

## 2020-07-08 ENCOUNTER — TELEPHONE (OUTPATIENT)
Dept: ENT CLINIC | Age: 60
End: 2020-07-08

## 2020-07-08 ENCOUNTER — PATIENT MESSAGE (OUTPATIENT)
Dept: ENT CLINIC | Age: 60
End: 2020-07-08

## 2020-07-08 ENCOUNTER — OFFICE VISIT (OUTPATIENT)
Dept: ENT CLINIC | Age: 60
End: 2020-07-08
Payer: COMMERCIAL

## 2020-07-08 VITALS
HEIGHT: 65 IN | BODY MASS INDEX: 30.99 KG/M2 | WEIGHT: 186 LBS | TEMPERATURE: 97.9 F | SYSTOLIC BLOOD PRESSURE: 155 MMHG | DIASTOLIC BLOOD PRESSURE: 80 MMHG | HEART RATE: 92 BPM

## 2020-07-08 PROCEDURE — 31238 NSL/SINS NDSC SRG NSL HEMRRG: CPT | Performed by: OTOLARYNGOLOGY

## 2020-07-08 PROCEDURE — 99203 OFFICE O/P NEW LOW 30 MIN: CPT | Performed by: OTOLARYNGOLOGY

## 2020-07-08 NOTE — PROGRESS NOTES
[facility]      Patient Name: Osvaldo [facility] Record Number:  [id number]  Primary Care Physician:  Christian Smith MD  Date of Consultation: 7/8/2020    Chief Complaint: Epistaxis        HISTORY [address] is a(n) 64 y.o. female who presents for evaluation of epistaxis. Reportedly the patient started having some intermittent nosebleeds about 12 days ago. This is always on the left side. She had a severe bleed on Sunday night into Monday morning. She went to the emergency room and a Rhino Rocket was placed. She is not had any significant bleeding since that time. She complains of a lot of pressure associated with this. She said that she has not had any issues with nosebleeds as an adult, but did have some as a child. She has had a couple different endoscopic sinus surgeries. Few years ago she had endoscopic sinus surgery as well as a septoplasty. She thinks that she was breathing pretty well out of her nose prior to this, but did feel some facial pressure even prior to the nosebleeds. She was using Flonase, but has stopped this because of the nosebleeding.       Patient Active Problem List   Diagnosis    Hyperlipidemia    Diabetes mellitus type 1 (HCC)    Hypokalemia    Abdominal pain    Knee pain, right    Fatigue    Cold intolerance    Pedal edema    GERD (gastroesophageal reflux disease)    Pharyngitis    Chronic pain of right ankle    Acquired hypothyroidism    Arthralgia of both hands    Bronchitis    Cellulitis of left foot    MRSA (methicillin resistant Staphylococcus aureus) infection    Abscess    Diabetic foot infection ([facility] [address]     Past Surgical History:   Procedure Laterality Date    BLADDER SUSPENSION      CHOLECYSTECTOMY  october 1991    FOOT DEBRIDEMENT Left 8/1/2019    INCISION AND DRAINAGE DOWN TO AND INCLUDING MUSCLE LEFT FOOT, STAGED PROCEDURE performed by Toby Cassidy DPM at [address] Left 8/5/2019    DELAYED PRIMARY CLOSURE LEFT [facility]  [facility]    partial    KNEE ARTHROSCOPY      right    OVARY REMOVAL      right    SINUS SURGERY       Family History   Problem Relation Age of Onset    Stroke Maternal Grandmother     Asthma Maternal Grandmother     Stroke Maternal Grandfather      Social History     Socioeconomic History    Marital status:      Spouse name: Not on file    Number of children: Not on file    Years of education: Not on file    Highest education level: Not on file   Occupational History    Not on file   Social Needs    Financial resource strain: Not on file    Food insecurity     Worry: Not on file     Inability: Not on file    Transportation needs     Medical: Not on file     Non-medical: Not on file   Tobacco Use    Smoking status: Former Smoker    Smokeless tobacco: Never Used   Substance and Sexual Activity    Alcohol use: No     Alcohol/week: 0.0 standard drinks    Drug use: No    Sexual activity: Not on file   Lifestyle    Physical activity     Days per week: Not on file     Minutes per session: Not on file    Stress: Not on file   Relationships    Social connections     Talks on phone: Not on file     Gets together: Not on file     Attends Sabianism service: Not on file     Active member of club or organization: Not on file     Attends meetings of clubs or organizations: Not on file     Relationship status: Not on file    Intimate partner violence     Fear of current or ex partner: Not on file     Emotionally abused: Not on file     Physically abused: Not on file     Forced sexual activity: Not on file   Other Topics Concern    Not on file   Social History Narrative    Not on file       DRUG/FOOD ALLERGIES: Zithromax [azithromycin]    CURRENT MEDICATIONS  Prior to Admission medications    Medication Sig Start Date End Date Taking?  Authorizing Provider   amoxicillin-clavulanate (AUGMENTIN) 875-125 MG per tablet Take 1 tablet by mouth 2 times daily for 10 days 7/6/20 7/16/20  Annabelle Costello MD   HYDROcodone-acetaminophen (NORCO) 5-325 MG per tablet Take 1 tablet by mouth every 4 hours as needed for Pain for up to 3 days. Intended supply: 3 days.  Take lowest dose possible to manage pain 7/6/20 7/9/20  Annabelle Costello MD   insulin lispro, 1 Unit Dial, [facility]) 100 UNIT/ML SOPN 8-16 units AC TID 6/29/20   Felicitas Mendoza MD   Continuous Blood Gluc Sensor (FREESTYLE JOCY 14 DAY SENSOR) MISC Every 2 weeks 6/29/20   Felicitas Mendoza MD   insulin glargine (LANTUS SOLOSTAR) 100 UNIT/ML injection pen 28 units daily 12/23/19   Felicitas Mendoza MD   insulin lispro, 1 Unit Dial, (HUMALOG KWIKPEN) 100 UNIT/ML SOPN 8 units AC TID 12/20/19   Felicitas Mendoza MD   insulin glargine (LANTUS SOLOSTAR) 100 UNIT/ML injection pen INJECT UNDER THE SKIN 18 UNITS DAILY 11/21/19   Felicitas Mendoza MD   Continuous Blood Gluc  (FREESTYLE JOCY 14 DAY READER) BECCA Every 2 weeks 10/17/19   Felicitas Mendoza MD   omeprazole (PRILOSEC) 20 MG delayed release capsule Take 20 mg by mouth Daily    Historical Provider, MD   ibuprofen (ADVIL;MOTRIN) 800 MG tablet Take 1 tablet by mouth every 8 hours as needed for Pain 7/26/19   LEO Mack - CNP   Insulin Pen Needle (B-D UF III MINI PEN NEEDLES) 31G X 5 MM MISC 4 times a day 6/27/19   Felicitas Mendoza MD   simvastatin (ZOCOR) 40 MG tablet TAKE 1 TABLET DAILY 1/2/19   Birgit García DO   FREESTYLE LITE strip TEST GLUCOSE TWICE A DAY 8/6/18   Birgit García DO   levothyroxine (SYNTHROID) 50 MCG tablet TAKE 1 TABLET ON MONDAY, [facility] AND FRIDAY 2/2/18   Naaman García, DO   levothyroxine (SYNTHROID) 75 MCG tablet TAKE 1 TABLET ON Kendra Morning, [address]  2/2/18   Birgit García DO   aspirin 81 MG EC tablet Take 1 by mouth every other day    Historical Provider, MD       REVIEW OF SYSTEMS  The following systems were reviewed and revealed the following in addition to any already discussed in the HPI:    CONSTITUTIONAL: no weight loss, no fever, no night sweats, no chills  EYES: no vision changes, no blurry vision  EARS: no changes in hearing, no otalgia  NOSE: Epistaxis, history of chronic sinusitis  RESPIRATORY: no  Difficulty breathing, no shortness of breath  CV: no chest pain, no Peripheral vascular disease  HEME: No coagulation disorder, no Bleeding disorder  NEURO: no TIA or stroke-like symptoms  SKIN: No new rashes in the head and neck, no recent skin cancers  MOUTH: No new ulcers, no recent teeth infections  GASTROINTESTINAL: No diarrhea, stomach pain  PSYCH: No anxiety, no depression      PHYSICAL EXAM  BP (!) 155/80 (Site: Right Upper Arm, Position: Sitting, Cuff Size: Medium Adult)   Pulse 92   Temp 97.9 °F (36.6 °C) (Temporal)   Ht 5' 5\" (1.651 m)   Wt 186 lb (84.4 kg)   LMP  (LMP Unknown)   BMI 30.95 kg/m²     GENERAL: No Acute Distress, Alert and Oriented, no Hoarseness, strong voice  EYES: EOMI, Anti-icteric  HENT:   Head: Normocephalic and atraumatic.    Face:  Symmetric, facial nerve intact, no sinus tenderness  Right Ear: Normal external ear, normal external auditory canal, intact tympanic membrane with normal mobility and aerated middle ear  Left Ear: Normal external ear, normal external auditory canal, intact tympanic membrane with normal mobility and aerated middle ear  Mouth/Oral Cavity:  normal lips, Uvula is midline, no mucosal lesions, no trismus, normal dentition, normal salivary quality/flow  Oropharynx/Larynx:  normal oropharynx, normal tonsils; normal larynx/nasopharynx on mirror exam  Nose: See below  NECK: Normal range of motion, no thyromegaly, trachea is midline, no lymphadenopathy, no neck masses, no crepitus  CHEST: Normal respiratory effort, no retractions, breathing comfortably  SKIN: No rashes, normal appearing skin, no evidence of skin lesions/tumors  Neuro:  cranial nerve II-XII intact; normal gait  Cardio:  no edema      PROCEDURE  Nasal endoscopy with control of epistaxis  Afrin and lidocaine were applied the anterior aspect of the Rhino Rocket in the left nasal cavity. The cuff was deflated it was slowly removed. Afrin and lidocaine was then applied the bilateral nasal cavity. There degree scope was used to visualize the bilateral nasal cavity. On the left side there was some mild oozing along the nasal floor anteriorly. More posteriorly was a blood clot and some excoriations of the septum. There was old clot in the maxillary sinus itself. The right side there was a residual rightward septal deviation. She appeared to have a previous anterior ethmoidectomy and maxillary trust me. I do not appreciate any polyps or purulent drainage    I applied silver nitrate with the help of an endoscope to the anterior nasal floor as well as the posterior nasal septum. I then placed a piece of Surgicel between the posterior aspect of the inferior turbinate and septum. ASSESSMENT/PLAN  1. Epistaxis  Difficult to tell exactly where the patient was bleeding a few days ago after the [employer] in place I did cauterize an area both anteriorly and posteriorly left nasal cavity. I recommended that she use nasal saline irrigations several times a day to wash this out. She should continue to hold the Flonase. I would like to see her in 2 to 3 weeks to see if I can make out any other abnormality after the blood has had time to resorb. She has active bleeding she should call my office and I can see her that day. 2. Chronic sinusitis, unspecified location  No obvious sinusitis today, will reevaluate in 2 to 3 weeks    3. Allergic rhinitis, unspecified seasonality, unspecified trigger  Ongoing allergic rhinitis, hold Flonase for now. Start nasal saline irrigations.   Continue oral antihistamine             I have performed a head and neck physical exam personally or was physically present during the key or critical portions of the service. Medical Decision Making:   The following items were considered in medical decision making:  Independent review of images  Review / order clinical lab tests  Review / order radiology tests  Decision to obtain old records

## 2020-07-10 NOTE — TELEPHONE ENCOUNTER
Do not see any benefit to taking aspirin every other day so I would hold it for now    Do not take Afrin for stuffiness, but you can use it only if you have a nosebleed otherwise her nose will get addicted.     I would wait a week to blow your nose

## 2020-07-10 NOTE — TELEPHONE ENCOUNTER
From: Tash Luciano  To: Jed Laws MD  Sent: 7/8/2020 3:37 PM EDT  Subject: Visit Follow-Up Question    Am I allowed to start taking aspirin every other day again and if not, when? Can I take Afrin to help with stuffiness? When can I start blowing my nose again?     Thank you,    Herbert Gutiérrez

## 2020-10-26 RX ORDER — INSULIN GLARGINE 100 [IU]/ML
INJECTION, SOLUTION SUBCUTANEOUS
Qty: 5 PEN | Refills: 2 | Status: SHIPPED | OUTPATIENT
Start: 2020-10-26

## 2022-02-15 ENCOUNTER — HOSPITAL ENCOUNTER (EMERGENCY)
Age: 62
Discharge: HOME | End: 2022-02-16
Attending: EMERGENCY MEDICINE
Payer: COMMERCIAL

## 2022-02-15 ENCOUNTER — APPOINTMENT (OUTPATIENT)
Dept: CT IMAGING | Age: 62
End: 2022-02-15
Payer: COMMERCIAL

## 2022-02-15 DIAGNOSIS — K52.9 GASTROENTERITIS: ICD-10-CM

## 2022-02-15 DIAGNOSIS — E16.2 HYPOGLYCEMIA: Primary | ICD-10-CM

## 2022-02-15 LAB
GLUCOSE BLD-MCNC: 122 MG/DL (ref 70–99)
PERFORMED ON: ABNORMAL

## 2022-02-15 PROCEDURE — 85025 COMPLETE CBC W/AUTO DIFF WBC: CPT

## 2022-02-15 PROCEDURE — 96374 THER/PROPH/DIAG INJ IV PUSH: CPT

## 2022-02-15 PROCEDURE — 96375 TX/PRO/DX INJ NEW DRUG ADDON: CPT

## 2022-02-15 PROCEDURE — 36415 COLL VENOUS BLD VENIPUNCTURE: CPT

## 2022-02-15 PROCEDURE — 84484 ASSAY OF TROPONIN QUANT: CPT

## 2022-02-15 PROCEDURE — 83690 ASSAY OF LIPASE: CPT

## 2022-02-15 PROCEDURE — 99283 EMERGENCY DEPT VISIT LOW MDM: CPT

## 2022-02-15 PROCEDURE — 83880 ASSAY OF NATRIURETIC PEPTIDE: CPT

## 2022-02-15 PROCEDURE — 80053 COMPREHEN METABOLIC PANEL: CPT

## 2022-02-15 RX ORDER — 0.9 % SODIUM CHLORIDE 0.9 %
1000 INTRAVENOUS SOLUTION INTRAVENOUS ONCE
Status: COMPLETED | OUTPATIENT
Start: 2022-02-16 | End: 2022-02-16

## 2022-02-15 RX ORDER — ONDANSETRON 2 MG/ML
4 INJECTION INTRAMUSCULAR; INTRAVENOUS ONCE
Status: COMPLETED | OUTPATIENT
Start: 2022-02-16 | End: 2022-02-16

## 2022-02-16 ENCOUNTER — APPOINTMENT (OUTPATIENT)
Dept: CT IMAGING | Age: 62
End: 2022-02-16
Payer: COMMERCIAL

## 2022-02-16 VITALS
HEART RATE: 94 BPM | WEIGHT: 199.08 LBS | OXYGEN SATURATION: 94 % | SYSTOLIC BLOOD PRESSURE: 138 MMHG | BODY MASS INDEX: 33.13 KG/M2 | DIASTOLIC BLOOD PRESSURE: 64 MMHG | RESPIRATION RATE: 19 BRPM | TEMPERATURE: 98.6 F

## 2022-02-16 LAB
A/G RATIO: 1.4 (ref 1.1–2.2)
ALBUMIN SERPL-MCNC: 4 G/DL (ref 3.4–5)
ALP BLD-CCNC: 70 U/L (ref 40–129)
ALT SERPL-CCNC: 24 U/L (ref 10–40)
ANION GAP SERPL CALCULATED.3IONS-SCNC: 13 MMOL/L (ref 3–16)
AST SERPL-CCNC: 29 U/L (ref 15–37)
BASOPHILS ABSOLUTE: 0 K/UL (ref 0–0.2)
BASOPHILS RELATIVE PERCENT: 0.2 %
BILIRUB SERPL-MCNC: 0.3 MG/DL (ref 0–1)
BILIRUBIN URINE: NEGATIVE
BLOOD, URINE: ABNORMAL
BUN BLDV-MCNC: 19 MG/DL (ref 7–20)
CALCIUM SERPL-MCNC: 9 MG/DL (ref 8.3–10.6)
CHLORIDE BLD-SCNC: 100 MMOL/L (ref 99–110)
CLARITY: CLEAR
CO2: 24 MMOL/L (ref 21–32)
COLOR: YELLOW
CREAT SERPL-MCNC: 0.6 MG/DL (ref 0.6–1.1)
EOSINOPHILS ABSOLUTE: 0 K/UL (ref 0–0.6)
EOSINOPHILS RELATIVE PERCENT: 0.2 %
EPITHELIAL CELLS, UA: 3 /HPF (ref 0–5)
GFR AFRICAN AMERICAN: >60
GFR NON-AFRICAN AMERICAN: >60
GLUCOSE BLD-MCNC: 124 MG/DL (ref 70–99)
GLUCOSE BLD-MCNC: 225 MG/DL (ref 70–99)
GLUCOSE URINE: NEGATIVE MG/DL
HCT VFR BLD CALC: 38.9 % (ref 36–48)
HEMOGLOBIN: 12.7 G/DL (ref 12–16)
HYALINE CASTS: 2 /LPF (ref 0–8)
KETONES, URINE: >=80 MG/DL
LEUKOCYTE ESTERASE, URINE: ABNORMAL
LIPASE: 15 U/L (ref 13–60)
LYMPHOCYTES ABSOLUTE: 1 K/UL (ref 1–5.1)
LYMPHOCYTES RELATIVE PERCENT: 6.1 %
MCH RBC QN AUTO: 27.8 PG (ref 26–34)
MCHC RBC AUTO-ENTMCNC: 32.7 G/DL (ref 31–36)
MCV RBC AUTO: 85 FL (ref 80–100)
MICROSCOPIC EXAMINATION: YES
MONOCYTES ABSOLUTE: 1 K/UL (ref 0–1.3)
MONOCYTES RELATIVE PERCENT: 6.3 %
NEUTROPHILS ABSOLUTE: 14.4 K/UL (ref 1.7–7.7)
NEUTROPHILS RELATIVE PERCENT: 87.2 %
NITRITE, URINE: NEGATIVE
PDW BLD-RTO: 14.4 % (ref 12.4–15.4)
PERFORMED ON: ABNORMAL
PH UA: 6.5 (ref 5–8)
PLATELET # BLD: 306 K/UL (ref 135–450)
PMV BLD AUTO: 7.7 FL (ref 5–10.5)
POTASSIUM REFLEX MAGNESIUM: 4.1 MMOL/L (ref 3.5–5.1)
PRO-BNP: 75 PG/ML (ref 0–124)
PROTEIN UA: NEGATIVE MG/DL
RBC # BLD: 4.58 M/UL (ref 4–5.2)
RBC UA: 1 /HPF (ref 0–4)
SODIUM BLD-SCNC: 137 MMOL/L (ref 136–145)
SPECIFIC GRAVITY UA: 1.02 (ref 1–1.03)
TOTAL PROTEIN: 6.9 G/DL (ref 6.4–8.2)
TROPONIN: <0.01 NG/ML
URINE REFLEX TO CULTURE: ABNORMAL
URINE TYPE: ABNORMAL
UROBILINOGEN, URINE: 1 E.U./DL
WBC # BLD: 16.5 K/UL (ref 4–11)
WBC UA: 3 /HPF (ref 0–5)

## 2022-02-16 PROCEDURE — 81001 URINALYSIS AUTO W/SCOPE: CPT

## 2022-02-16 PROCEDURE — 96374 THER/PROPH/DIAG INJ IV PUSH: CPT

## 2022-02-16 PROCEDURE — 6360000002 HC RX W HCPCS: Performed by: EMERGENCY MEDICINE

## 2022-02-16 PROCEDURE — 2580000003 HC RX 258: Performed by: EMERGENCY MEDICINE

## 2022-02-16 PROCEDURE — 2500000003 HC RX 250 WO HCPCS: Performed by: EMERGENCY MEDICINE

## 2022-02-16 PROCEDURE — 96375 TX/PRO/DX INJ NEW DRUG ADDON: CPT

## 2022-02-16 PROCEDURE — 74177 CT ABD & PELVIS W/CONTRAST: CPT

## 2022-02-16 PROCEDURE — 6360000004 HC RX CONTRAST MEDICATION: Performed by: EMERGENCY MEDICINE

## 2022-02-16 RX ORDER — KETOROLAC TROMETHAMINE 30 MG/ML
30 INJECTION, SOLUTION INTRAMUSCULAR; INTRAVENOUS ONCE
Status: COMPLETED | OUTPATIENT
Start: 2022-02-16 | End: 2022-02-16

## 2022-02-16 RX ORDER — ONDANSETRON 4 MG/1
4-8 TABLET, ORALLY DISINTEGRATING ORAL EVERY 12 HOURS PRN
Qty: 12 TABLET | Refills: 0 | Status: SHIPPED | OUTPATIENT
Start: 2022-02-16

## 2022-02-16 RX ORDER — METOCLOPRAMIDE HYDROCHLORIDE 5 MG/ML
10 INJECTION INTRAMUSCULAR; INTRAVENOUS ONCE
Status: COMPLETED | OUTPATIENT
Start: 2022-02-16 | End: 2022-02-16

## 2022-02-16 RX ADMIN — ONDANSETRON 4 MG: 2 INJECTION INTRAMUSCULAR; INTRAVENOUS at 00:13

## 2022-02-16 RX ADMIN — IOPAMIDOL 75 ML: 755 INJECTION, SOLUTION INTRAVENOUS at 01:01

## 2022-02-16 RX ADMIN — FAMOTIDINE 20 MG: 10 INJECTION, SOLUTION INTRAVENOUS at 02:26

## 2022-02-16 RX ADMIN — METOCLOPRAMIDE HYDROCHLORIDE 10 MG: 5 INJECTION INTRAMUSCULAR; INTRAVENOUS at 02:26

## 2022-02-16 RX ADMIN — KETOROLAC TROMETHAMINE 30 MG: 30 INJECTION, SOLUTION INTRAMUSCULAR at 02:26

## 2022-02-16 RX ADMIN — SODIUM CHLORIDE 1000 ML: 9 INJECTION, SOLUTION INTRAVENOUS at 00:13

## 2022-02-16 ASSESSMENT — PAIN SCALES - GENERAL: PAINLEVEL_OUTOF10: 3

## 2022-02-16 ASSESSMENT — ENCOUNTER SYMPTOMS
WHEEZING: 0
EYE DISCHARGE: 0
EYE ITCHING: 0
STRIDOR: 0
CHEST TIGHTNESS: 0
ABDOMINAL PAIN: 1
APNEA: 0
ABDOMINAL DISTENTION: 0
COUGH: 0
CHOKING: 0
EYE PAIN: 0
DIARRHEA: 1
SHORTNESS OF BREATH: 0
NAUSEA: 1
PHOTOPHOBIA: 0
EYE REDNESS: 0

## 2022-02-16 NOTE — ED NOTES
Bed: B-10  Expected date:   Expected time:   Means of arrival:   Comments:  Jose hypoglycemia abd pain      Bryon Panda RN  02/15/22 5232

## 2022-02-16 NOTE — ED NOTES
Provider order placed for patient's discharge. Provider reviewed decision to discharge with the patient. Discharge paperwork and any prescriptions were reviewed with the patient. Patient verbalized understanding of discharge education and any prescriptions and has no further questions or further needs at this time. Patient left with all personal belongings and was stable upon departure. Patient thanked for choosing [facility] ([facility]) and informed to return should any need arise.        Morena Sinha RN  02/16/22 [id number]

## 2022-02-16 NOTE — ED TRIAGE NOTES
Patient presents to ED via EMS for c/o hypoglycemia. Per patient, BS 41. Patient states she drank sprite and ate candy to try to raise her BS, raised to 47 then back down to 42. Patient states she then called EMS and then her glucometer showed \"low\". Patient states she has been fighting her blood sugar since 7pm and that it never takes this long to come up. Patient also reports N/V this evening, was given 4mg zofran IV and PO without relief. Patient c/o \"full\" feeling and \"bloating\" with abdominal discomfort.

## 2022-02-16 NOTE — ED PROVIDER NOTES
[address] [facility]      Pt Name: Paula Dominguez  MRN: [id number]  [place] 1960  Date of evaluation: 2/15/2022  Provider: Dru Sidhu MD    CHIEF COMPLAINT       Chief Complaint   Patient presents with    Hypoglycemia       HISTORY OF PRESENT ILLNESS    Paula Dominguez is a 63 y.o. female who presents to the emergency department with hypoglycemia and abdominal pain. Patient's blood sugar was 41 at home and was having hypoglycemia-like symptoms. She was given candy and sugar and her sugars did not improve at home. She was brought in via EMS. On arrival sugars had improved. Is complaining of gastroenteritis-like symptoms at all started earlier today. She is complaining of nausea and vomiting. Also of generalized abdominal pain. States her abdomen feels full and she has bloating. Does endorse some loose watery stools today as well. Pain is worse with movement. Better with rest.  Never happened before. Currently 4-10 achy in nature. No other associated symptoms. Nursing Notes were reviewed. Including nursing noted for FM, Surgical History, Past Medical History, Social History, vitals, and allergies; agree with all. REVIEW OF SYSTEMS       Review of Systems   Constitutional: Negative for chills, diaphoresis, fatigue, fever and unexpected weight change. HENT: Negative for congestion, dental problem, drooling and ear discharge. Eyes: Negative for photophobia, pain, discharge, redness and itching. Respiratory: Negative for apnea, cough, choking, chest tightness, shortness of breath, wheezing and stridor. Cardiovascular: Negative for chest pain and leg swelling. Gastrointestinal: Positive for abdominal pain, diarrhea and nausea. Negative for abdominal distention. Endocrine: Negative for polyphagia and polyuria. Genitourinary: Negative for vaginal bleeding, vaginal discharge and vaginal pain. Musculoskeletal: Negative for arthralgias. Neurological: Negative for dizziness, tremors and headaches. Hematological: Negative for adenopathy. Does not bruise/bleed easily. Psychiatric/Behavioral: Negative for agitation, behavioral problems, confusion, decreased concentration, dysphoric mood, hallucinations, self-injury, sleep disturbance and suicidal ideas. The patient is not nervous/anxious and is not hyperactive. Except as noted above the remainder of the review of systems was reviewed and negative.      PAST MEDICAL HISTORY     Past Medical History:   Diagnosis Date    Diabetes mellitus type 1 ([facility] [address]     Hyperlipidemia     MRSA (methicillin resistant staph aureus) culture positive 07/29/2019    foot    Thyroid disease        SURGICAL HISTORY       Past Surgical History:   Procedure Laterality Date    BLADDER SUSPENSION      CHOLECYSTECTOMY  october 1991    FOOT DEBRIDEMENT Left 8/1/2019    INCISION AND DRAINAGE DOWN TO AND INCLUDING MUSCLE LEFT FOOT, STAGED PROCEDURE performed by Kiah Weaver DPM at [address] [place] [address] Left 8/5/2019    DELAYED PRIMARY CLOSURE LEFT FOOT performed by Kiah Weaver DPM at [facility] 5 november 1998    partial    KNEE ARTHROSCOPY      right    OVARY REMOVAL      right    SINUS SURGERY         CURRENT MEDICATIONS       Discharge Medication List as of 2/16/2022  3:04 AM      CONTINUE these medications which have NOT CHANGED    Details   !! insulin glargine (LANTUS SOLOSTAR) 100 UNIT/ML injection pen INJECT 28 UNITS UNDER THE SKIN DAILY, Disp-5 pen, R-2Normal      !! insulin lispro, 1 Unit Dial, (HUMALOG KWIKPEN) 100 UNIT/ML SOPN 8-16 units AC TID, Disp-4 pen, R-0Normal      Continuous Blood Gluc Sensor (FREESTYLE JOCY 14 DAY SENSOR) MISC Every 2 weeks, Disp-2 each, R-0Normal      !! insulin lispro, 1 Unit Dial, (HUMALOG KWIKPEN) 100 UNIT/ML SOPN 8 units AC TID, Disp-10 pen, R-2Normal      !! insulin glargine (LANTUS SOLOSTAR) 100 UNIT/ML injection pen INJECT UNDER THE SKIN 18 UNITS DAILY, Disp-1 pen, R-3Normal      Continuous Blood Gluc  (FREESTYLE JOCY 14 DAY READER) BECCA Every 2 weeks, Disp-1 Device, R-0Normal      omeprazole (PRILOSEC) 20 MG delayed release capsule Take 20 mg by mouth DailyHistorical Med      ibuprofen (ADVIL;MOTRIN) 800 MG tablet Take 1 tablet by mouth every 8 hours as needed for Pain, Disp-30 tablet, R-0Print      Insulin Pen Needle (B-D UF III MINI PEN NEEDLES) 31G X 5 MM MISC Disp-150 each, R-6, Normal4 times a day      simvastatin (ZOCOR) 40 MG tablet TAKE 1 TABLET DAILY, Disp-90 tablet, R-0Normal      FREESTYLE LITE strip TEST GLUCOSE TWICE A DAY, Disp-200 each, R-3Normal      !! levothyroxine (SYNTHROID) 50 MCG tablet TAKE 1 TABLET ON MONDAY, WEDNESDAY AND FRIDAY, Disp-180 tablet, R-1Normal      !! levothyroxine (SYNTHROID) 75 MCG tablet TAKE 1 TABLET ON TUESDAY, THURSDAY, SATURDAY AND SUNDAY, Disp-180 tablet, R-1Normal      aspirin 81 MG EC tablet Take 1 by mouth every other day       !! - Potential duplicate medications found. Please discuss with provider.           ALLERGIES     Zithromax [azithromycin]    FAMILY HISTORY        Family History   Problem Relation Age of Onset    Stroke Maternal Grandmother     Asthma Maternal Grandmother     Stroke Maternal Grandfather        SOCIAL HISTORY       Social History     Socioeconomic History    Marital status:      Spouse name: None    Number of children: None    Years of education: None    Highest education level: None   Occupational History    None   Tobacco Use    Smoking status: Former Smoker    Smokeless tobacco: Never Used   Vaping Use    Vaping Use: Never used   Substance and Sexual Activity    Alcohol use: No     Alcohol/week: 0.0 standard drinks    Drug use: No    Sexual activity: None   Other Topics Concern    None   Social History Narrative    None     Social Determinants of Health     Financial Resource Strain:     Difficulty of Paying Living Expenses: Not on file   Food Insecurity:     Worried About [address] in the Last Year: Not on file    Ran Out of Food in the Last Year: Not on file   Transportation Needs:     Lack of Transportation (Medical): Not on file    Lack of Transportation (Non-Medical): Not on file   Physical Activity:     Days of Exercise per Week: Not on file    Minutes of Exercise per Session: Not on file   Stress:     Feeling of Stress : Not on file   Social Connections:     Frequency of Communication with Friends and Family: Not on file    Frequency of Social Gatherings with Friends and Family: Not on file    Attends Taoist Services: Not on file    Active Member of [address] [place] [address] or Organizations: Not on file    Attends Club or Organization Meetings: Not on file    Marital Status: Not on file   Intimate Partner Violence:     Fear of Current or Ex-Partner: Not on file    Emotionally Abused: Not on file    Physically Abused: Not on file    Sexually Abused: Not on file   Housing Stability:     Unable to Pay for Housing in the Last Year: Not on file    Number of [place] in the Last Year: Not on file    Unstable Housing in the Last Year: Not on file       PHYSICAL EXAM       ED Triage Vitals [02/15/22 2338]   BP Temp Temp Source Pulse Resp SpO2 Height Weight   (!) 152/62 98.6 °F (37 °C) Oral 86 15 100 % -- 199 lb 1.2 oz (90.3 kg)       Physical Exam  Vitals and nursing note reviewed. Constitutional:       General: She is not in acute distress. Appearance: She is well-developed. She is not ill-appearing, toxic-appearing or diaphoretic. HENT:      Head: Normocephalic and atraumatic. Right Ear: External ear normal.      Left Ear: External ear normal.   Eyes:      General:         Right eye: No discharge. Left eye: No discharge. Conjunctiva/sclera: Conjunctivae normal.      Pupils: Pupils are equal, round, and reactive to light.    Cardiovascular:      Rate and Rhythm: Normal rate and regular rhythm. Heart sounds: No murmur heard. Pulmonary:      Effort: Pulmonary effort is normal. No respiratory distress. Breath sounds: Normal breath sounds. No wheezing or rales. Abdominal:      General: Bowel sounds are normal. There is no distension. Palpations: Abdomen is soft. There is no mass. Tenderness: There is no abdominal tenderness. There is no guarding or rebound. Genitourinary:     Comments: Deferred  Musculoskeletal:         General: No deformity. Normal range of motion. Cervical back: Normal range of motion and neck supple. Skin:     General: Skin is warm. Findings: No erythema or rash. Neurological:      Mental Status: She is alert and oriented to person, place, and time. She is not disoriented. Cranial Nerves: No cranial nerve deficit. Motor: No atrophy or abnormal muscle tone. Coordination: Coordination normal.   Psychiatric:         Behavior: Behavior normal.         Thought Content: Thought content normal.         DIAGNOSTIC RESULTS     RADIOLOGY:   Non-plain film images such as CT, Ultrasoundand MRI are read by the radiologist. Plain radiographic images are visualized and preliminarily interpreted by the emergency physician with the below findings:    Impression   Multiple fluid-filled small bowel loops, nonspecific finding which can be   seen with acute enteritis. .       Small to moderate amount of retained stool throughout the colon and rectum   with no evidence of bowel obstruction.       Normal appendix.      ED BEDSIDE ULTRASOUND:   Performed by ED Physician - none    LABS:  Labs Reviewed   CBC WITH AUTO DIFFERENTIAL - Abnormal; Notable for the following components:       Result Value    WBC 16.5 (*)     Neutrophils Absolute 14.4 (*)     All other components within normal limits    Narrative:     Performed at:  [address] [address]   Phone [phone]   COMPREHENSIVE METABOLIC PANEL W/ REFLEX TO MG FOR LOW K - Abnormal; Notable for the following components:    Glucose 124 (*)     All other components within normal limits    Narrative:     Performed at:  [facility]  [address], [facility] [employer] [address]   Phone [phone]   URINE RT REFLEX TO CULTURE - Abnormal; Notable for the following components:    Ketones, Urine >=80 (*)     Blood, Urine SMALL (*)     Leukocyte Esterase, Urine TRACE (*)     All other components within normal limits    Narrative:     Performed at:  [facility]  [address], [facility] [employer] [address]   Phone [phone]   POCT GLUCOSE - Abnormal; Notable for the following components:    POC Glucose 122 (*)     All other components within normal limits    Narrative:     Performed at:  [facility]  [address], [facility] [employer] [address]   Phone [phone]   POCT GLUCOSE - Abnormal; Notable for the following components:    POC Glucose 225 (*)     All other components within normal limits    Narrative:     Performed at:  [facility]  [address], [facility] [address]   Phone [phone]   LIPASE    Narrative:     Performed at:  [facility]  [address], [facility] [address]   Phone [phone]   TROPONIN    Narrative:     Performed at:  [facility]  [address], [facility] [address]   Phone [phone]   BRAIN NATRIURETIC PEPTIDE    Narrative:     Performed at:  [facility]  [address], [facility] [employer] [address]   Phone [phone]   MICROSCOPIC URINALYSIS    Narrative:     Performed at:  [facility]  [address], [facility] [employer] [address]   Phone [phone]   POCT GLUCOSE   POCT GLUCOSE       All other labs were withinnormal range or not returned as of this dictation. EMERGENCY DEPARTMENT COURSE and DIFFERENTIAL DIAGNOSIS/MDM:     PMH, Surgical Hx, FH, Social Hx reviewed by myself (ETOH usage, Tobacco usage, Drug usage reviewed by myself, no pertinent Hx)- No Pertinent Hx     Old records were reviewed by me     82-year-old with resolved hypoglycemia and also found had evidence of gastroenteritis. Fluids and supportive care given. Tolerating p.o. Feels significantly better. Passing gas. No real significant pain. Sugars have been well controlled since being in the emergency room. Strict return precautions given. I estimate there is LOW risk for Sepsis, MI, Stroke, Tamponade, PTX, Toxicity or other life threatening etiology thus I consider the discharge disposition reasonable. The patient is at low risk for mortality based on demographic, history and clinical factors. Given the best available information and clinical assessment, I estimate the risk of hospitalization to be greater than risk of treatment at home. I have explained to the patient that the risk could rapidly change, given precautions for return and instructions. Explained to patient that the risk for mortality is low based on demographic, history and clinical factors. I discussed with patient the results of evaluation in the ED, diagnosis, care, and prognosis. The plan is to discharge to home. Patient is in agreement with plan and questions have been answered. I also discussed with patient the reasons which may require a return visit and the importance of follow-up care. The patient is well-appearing, nontoxic, and improved at the time of discharge. Patient agrees to call to arrange follow-up care as directed. Patient understands to return immediately for worsening/change in symptoms. CRITICAL CARE TIME   Total Critical Caretime was 21 minutes, excluding separately reportable procedures.   There was a high probability of

## 2023-01-30 ENCOUNTER — APPOINTMENT (OUTPATIENT)
Dept: GENERAL RADIOLOGY | Age: 63
End: 2023-01-30
Payer: COMMERCIAL

## 2023-01-30 ENCOUNTER — HOSPITAL ENCOUNTER (EMERGENCY)
Age: 63
Discharge: HOME | End: 2023-01-30
Attending: EMERGENCY MEDICINE
Payer: COMMERCIAL

## 2023-01-30 VITALS
OXYGEN SATURATION: 99 % | TEMPERATURE: 97.6 F | DIASTOLIC BLOOD PRESSURE: 90 MMHG | BODY MASS INDEX: 28.28 KG/M2 | SYSTOLIC BLOOD PRESSURE: 179 MMHG | RESPIRATION RATE: 17 BRPM | HEIGHT: 65 IN | HEART RATE: 71 BPM | WEIGHT: 169.75 LBS

## 2023-01-30 DIAGNOSIS — R00.2 PALPITATIONS: Primary | ICD-10-CM

## 2023-01-30 LAB
A/G RATIO: 1.5 (ref 1.1–2.2)
ALBUMIN SERPL-MCNC: 4.1 G/DL (ref 3.4–5)
ALP BLD-CCNC: 75 U/L (ref 40–129)
ALT SERPL-CCNC: 13 U/L (ref 10–40)
ANION GAP SERPL CALCULATED.3IONS-SCNC: 10 MMOL/L (ref 3–16)
AST SERPL-CCNC: 15 U/L (ref 15–37)
BASOPHILS ABSOLUTE: 0 K/UL (ref 0–0.2)
BASOPHILS RELATIVE PERCENT: 0.7 %
BILIRUB SERPL-MCNC: <0.2 MG/DL (ref 0–1)
BUN BLDV-MCNC: 21 MG/DL (ref 7–20)
CALCIUM SERPL-MCNC: 9.5 MG/DL (ref 8.3–10.6)
CHLORIDE BLD-SCNC: 103 MMOL/L (ref 99–110)
CO2: 26 MMOL/L (ref 21–32)
CREAT SERPL-MCNC: 0.7 MG/DL (ref 0.6–1.2)
EKG ATRIAL RATE: 68 BPM
EKG DIAGNOSIS: NORMAL
EKG P AXIS: 4 DEGREES
EKG P-R INTERVAL: 168 MS
EKG Q-T INTERVAL: 406 MS
EKG QRS DURATION: 78 MS
EKG QTC CALCULATION (BAZETT): 431 MS
EKG R AXIS: -29 DEGREES
EKG T AXIS: 85 DEGREES
EKG VENTRICULAR RATE: 68 BPM
EOSINOPHILS ABSOLUTE: 0.2 K/UL (ref 0–0.6)
EOSINOPHILS RELATIVE PERCENT: 2.5 %
GFR SERPL CREATININE-BSD FRML MDRD: >60 ML/MIN/{1.73_M2}
GLUCOSE BLD-MCNC: 195 MG/DL (ref 70–99)
HCT VFR BLD CALC: 39.7 % (ref 36–48)
HEMOGLOBIN: 12.8 G/DL (ref 12–16)
LYMPHOCYTES ABSOLUTE: 1.1 K/UL (ref 1–5.1)
LYMPHOCYTES RELATIVE PERCENT: 15.8 %
MAGNESIUM: 1.8 MG/DL (ref 1.8–2.4)
MCH RBC QN AUTO: 27.3 PG (ref 26–34)
MCHC RBC AUTO-ENTMCNC: 32.3 G/DL (ref 31–36)
MCV RBC AUTO: 84.6 FL (ref 80–100)
MONOCYTES ABSOLUTE: 0.6 K/UL (ref 0–1.3)
MONOCYTES RELATIVE PERCENT: 8.6 %
NEUTROPHILS ABSOLUTE: 5 K/UL (ref 1.7–7.7)
NEUTROPHILS RELATIVE PERCENT: 72.4 %
PDW BLD-RTO: 14.4 % (ref 12.4–15.4)
PLATELET # BLD: 263 K/UL (ref 135–450)
PMV BLD AUTO: 8.2 FL (ref 5–10.5)
POTASSIUM SERPL-SCNC: 4.1 MMOL/L (ref 3.5–5.1)
RBC # BLD: 4.69 M/UL (ref 4–5.2)
SODIUM BLD-SCNC: 139 MMOL/L (ref 136–145)
T4 FREE: 1.1 NG/DL (ref 0.9–1.8)
TOTAL PROTEIN: 6.9 G/DL (ref 6.4–8.2)
TROPONIN: <0.01 NG/ML
TSH REFLEX: 4.21 UIU/ML (ref 0.27–4.2)
WBC # BLD: 6.8 K/UL (ref 4–11)

## 2023-01-30 PROCEDURE — 84443 ASSAY THYROID STIM HORMONE: CPT

## 2023-01-30 PROCEDURE — 80053 COMPREHEN METABOLIC PANEL: CPT

## 2023-01-30 PROCEDURE — 71045 X-RAY EXAM CHEST 1 VIEW: CPT

## 2023-01-30 PROCEDURE — 93010 ELECTROCARDIOGRAM REPORT: CPT | Performed by: INTERNAL MEDICINE

## 2023-01-30 PROCEDURE — 99285 EMERGENCY DEPT VISIT HI MDM: CPT

## 2023-01-30 PROCEDURE — 84484 ASSAY OF TROPONIN QUANT: CPT

## 2023-01-30 PROCEDURE — 93005 ELECTROCARDIOGRAM TRACING: CPT | Performed by: EMERGENCY MEDICINE

## 2023-01-30 PROCEDURE — 85025 COMPLETE CBC W/AUTO DIFF WBC: CPT

## 2023-01-30 PROCEDURE — 83735 ASSAY OF MAGNESIUM: CPT

## 2023-01-30 PROCEDURE — 84439 ASSAY OF FREE THYROXINE: CPT

## 2023-01-30 ASSESSMENT — ENCOUNTER SYMPTOMS
ABDOMINAL PAIN: 0
CONSTIPATION: 0
SHORTNESS OF BREATH: 1
DIARRHEA: 0
NAUSEA: 0
WHEEZING: 0
SORE THROAT: 0
CHEST TIGHTNESS: 0
VOMITING: 0

## 2023-01-30 ASSESSMENT — PAIN - FUNCTIONAL ASSESSMENT: PAIN_FUNCTIONAL_ASSESSMENT: 0-10

## 2023-01-30 ASSESSMENT — PAIN SCALES - GENERAL: PAINLEVEL_OUTOF10: 0

## 2023-01-30 NOTE — DISCHARGE INSTRUCTIONS
Thank you for visiting [facility] Emergency Department. You need to call in morning to make appointment as directed with appropriate doctor. Be sure to stay well-hydrated drinking plenty of fluids over the next few days. Take any medications as prescribed, if given any, otherwise for pain Use ibuprofen or Tylenol (unless prescribed medications that have Tylenol in it). You can take over the counter Ibuprofen (advil) tablets (4 tablets every 8 hours or 3 tablets every 6 hours or 2 tablets every 4 hours)    Return to ED if symptoms worsen, do not improve, fever > 101.5, excessive nausea or vomiting, and unable to follow up with your physician, or any other care or concern.

## 2023-01-30 NOTE — ED PROVIDER NOTES
[address] [facility]      Pt Name: Damián Alexis  MRN: [id number]  [place] 1960  Date of evaluation: 1/30/2023  Provider: Saeed Coronel MD    CHIEF COMPLAINT       Chief Complaint   Patient presents with    Palpitations     Heart palpitations, feeling like heart is beating \"different\" starting last night around 10pm. With feelings of difficulty breathing. HISTORY OF PRESENT ILLNESS    Damián Alexis is a 63 y.o. female who  has a past medical history of Diabetes mellitus type 1 ([address] Hyperlipidemia, MRSA (methicillin resistant staph aureus) culture positive, and Thyroid disease. who presents to the emergency department with palpitations. Patient states that since last night around 10 PM she felt that her heart was fluttering or \"flipping\". Patient states that when she has the sensation that she feels slightly short of breath. Denies any cough or sputum production. No chest pain. No nausea or vomiting. Normal urinary and bowel habits. No fevers or chills. States she has history of similar symptoms in the past and was told at that time that she had low potassium. Patient states she did have an episode of diarrhea last night. Denies any melanotic stool or bright red blood per rectum. Patient also states she has history of hypothyroidism on levothyroxine but states she has been compliant with her medications and denies any medication dose adjustments recently. Patient states that she feels anxious and thinks the anxiety may be contributing to her symptoms as well. Nursing Notes were reviewed. Including nursing noted for FM, Surgical History, Past Medical History, Social History, vitals, and allergies; agree with all. REVIEW OF SYSTEMS       Review of Systems   Constitutional:  Negative for chills, diaphoresis and fever. HENT:  Negative for congestion and sore throat.     Respiratory:  Positive for shortness of breath. Negative for chest tightness and wheezing. Cardiovascular:  Positive for palpitations. Negative for chest pain and leg swelling. Gastrointestinal:  Negative for abdominal pain, constipation, diarrhea, nausea and vomiting. Genitourinary:  Negative for difficulty urinating, dysuria, frequency and urgency. Musculoskeletal:  Negative for arthralgias and neck pain. Skin:  Negative for rash and wound. Neurological:  Negative for dizziness, syncope, weakness, light-headedness and numbness. Psychiatric/Behavioral:  Negative for agitation, behavioral problems and confusion. Except as noted above the remainder of the review of systems was reviewed and negative.      PAST MEDICAL HISTORY     Past Medical History:   Diagnosis Date    Diabetes mellitus type 1 ([facility] [address] [facility]     Hyperlipidemia     MRSA (methicillin resistant staph aureus) culture positive 07/29/2019    foot    Thyroid disease        SURGICAL HISTORY       Past Surgical History:   Procedure Laterality Date    BLADDER SUSPENSION      CHOLECYSTECTOMY  october 1991    FOOT DEBRIDEMENT Left 8/1/2019    INCISION AND DRAINAGE DOWN TO AND INCLUDING MUSCLE LEFT FOOT, STAGED PROCEDURE performed by Karen Ramsey DPM at [facility] Left 8/5/2019    DELAYED PRIMARY CLOSURE LEFT FOOT performed by Karen Ramsey DPM at [address] [place] [address]  november 1998    partial    KNEE ARTHROSCOPY      right    OOPHORECTOMY      right    [facility]       Discharge Medication List as of 1/30/2023 10:25 AM        CONTINUE these medications which have NOT CHANGED    Details   ondansetron (ZOFRAN ODT) 4 MG disintegrating tablet Take 1-2 tablets by mouth every 12 hours as needed for Nausea May Sub regular tablet (non-ODT) if insurance does not cover ODT., Disp-12 tablet, R-0Normal      !! insulin glargine (LANTUS SOLOSTAR) 100 UNIT/ML injection pen INJECT 28 UNITS UNDER THE SKIN DAILY, Disp-5 pen, R-2Normal      !! insulin lispro, 1 Unit Dial, (HUMALOG KWIKPEN) 100 UNIT/ML SOPN 8-16 units AC TID, Disp-4 pen, R-0Normal      Continuous Blood Gluc Sensor (FREESTYLE JOCY 14 DAY SENSOR) MISC Every 2 weeks, Disp-2 each, R-0Normal      !! insulin lispro, 1 Unit Dial, (HUMALOG KWIKPEN) 100 UNIT/ML SOPN 8 units AC TID, Disp-10 pen, R-2Normal      !! insulin glargine (LANTUS SOLOSTAR) 100 UNIT/ML injection pen INJECT UNDER THE SKIN 18 UNITS DAILY, Disp-1 pen, R-3Normal      Continuous Blood Gluc  (FREESTYLE JOCY 14 DAY READER) BECCA Every 2 weeks, Disp-1 Device, R-0Normal      omeprazole (PRILOSEC) 20 MG delayed release capsule Take 20 mg by mouth DailyHistorical Med      ibuprofen (ADVIL;MOTRIN) 800 MG tablet Take 1 tablet by mouth every 8 hours as needed for Pain, Disp-30 tablet, R-0Print      Insulin Pen Needle (B-D UF III MINI PEN NEEDLES) 31G X 5 MM MISC Disp-150 each, R-6, Normal4 times a day      simvastatin (ZOCOR) 40 MG tablet TAKE 1 TABLET DAILY, Disp-90 tablet, R-0Normal      FREESTYLE LITE strip TEST GLUCOSE TWICE A DAY, Disp-200 each, R-3Normal      !! levothyroxine (SYNTHROID) 50 MCG tablet TAKE 1 TABLET ON MONDAY, WEDNESDAY AND FRIDAY, Disp-180 tablet, R-1Normal      !! levothyroxine (SYNTHROID) 75 MCG tablet TAKE 1 TABLET ON TUESDAY, THURSDAY, SATURDAY AND SUNDAY, Disp-180 tablet, R-1Normal      aspirin 81 MG EC tablet Take 1 by mouth every other day       !! - Potential duplicate medications found. Please discuss with provider.          ALLERGIES     Naproxen and Zithromax [azithromycin]    FAMILY HISTORY        Family History   Problem Relation Age of Onset    Stroke Maternal Grandmother     Asthma Maternal Grandmother     Stroke Maternal Grandfather        SOCIAL HISTORY       Social History     Socioeconomic History    Marital status:    Tobacco Use    Smoking status: Former    Smokeless tobacco: Never   Vaping Use    Vaping Use: Never used   Substance and Sexual Activity    Alcohol use: No      Alcohol/week: 0.0 standard drinks    Drug use: No       PHYSICAL EXAM       Vitals:    01/30/23 0724 01/30/23 0834   BP: (!) 179/90    Pulse: 71    Resp: 17    Temp:  97.6 °F (36.4 °C)   TempSrc:  Oral   SpO2: 99%    Weight: 169 lb 12.1 oz (77 kg)    Height: 5' 5\" (1.651 m)          Physical Exam  Vitals and nursing note reviewed. Constitutional:       General: She is not in acute distress. Appearance: She is well-developed. She is not diaphoretic. HENT:      Head: Normocephalic and atraumatic. Mouth/Throat:      Mouth: Mucous membranes are moist.      Pharynx: Oropharynx is clear. Eyes:      Extraocular Movements: Extraocular movements intact. Conjunctiva/sclera: Conjunctivae normal.      Pupils: Pupils are equal, round, and reactive to light. Cardiovascular:      Rate and Rhythm: Normal rate and regular rhythm. No extrasystoles are present. Pulses:           Radial pulses are 3+ on the right side and 3+ on the left side. Heart sounds: Normal heart sounds. No murmur heard. No friction rub. No gallop. Pulmonary:      Effort: Pulmonary effort is normal. No respiratory distress. Breath sounds: Normal breath sounds. No wheezing or rales. Abdominal:      General: Bowel sounds are normal. There is no distension. Palpations: Abdomen is soft. Tenderness: There is no abdominal tenderness. There is no guarding or rebound. Musculoskeletal:         General: No tenderness. Normal range of motion. Cervical back: Normal range of motion and neck supple. Right lower leg: No edema. Left lower leg: No edema. Skin:     General: Skin is warm and dry. Capillary Refill: Capillary refill takes less than 2 seconds. Findings: No bruising or erythema. Neurological:      Mental Status: She is alert and oriented to person, place, and time. Cranial Nerves: No cranial nerve deficit. Deep Tendon Reflexes: Reflexes are normal and symmetric.    Psychiatric: Mood and Affect: Mood normal.         Behavior: Behavior normal.       DIAGNOSTIC RESULTS     EKG: All EKG's are interpreted by the Emergency Department Physician who either signs or Co-signs this chart in the absence of acardiologist.    Interpreted by myself   Normal sinus rhythm with a rate of 68, normal axis, , QRS 78, QTc 431, no ST elevations, nonspecific ST changes, no ST depressions, no significant change compared EKG from 3/29/2019 except PVCs no longer present    RADIOLOGY:   Non-plain film images such as CT, Ultrasoundand MRI are read by the radiologist. Plain radiographic images are visualized and preliminarily interpreted by the emergency physician with the below findings:    Chest x-ray with no acute cardiopulmonary abnormality    ED BEDSIDE ULTRASOUND:   Performed by ED Physician - none    LABS:  Labs Reviewed   COMPREHENSIVE METABOLIC PANEL - Abnormal; Notable for the following components:       Result Value    Glucose 195 (*)     BUN 21 (*)     All other components within normal limits   TSH WITH REFLEX - Abnormal; Notable for the following components:    TSH 4.21 (*)     All other components within normal limits   CBC WITH AUTO DIFFERENTIAL   MAGNESIUM   TROPONIN   T4, FREE       All other labs were withinnormal range or not returned as of this dictation. EMERGENCY DEPARTMENT COURSE and DIFFERENTIAL DIAGNOSIS/MDM:     PMH, Surgical Hx, FH, Social Hx reviewed by myself (ETOH usage, Tobacco usage, Drug usage reviewed by myself, no pertinent Hx)-past medical history of hyperlipidemia, diabetes, hypothyroidism    PROCEDURES:  Procedures      Old records were reviewed by me     MDM    Patient seen and evaluated. History and physical above. Nontoxic and afebrile. Patient presents complaining of palpitations. No chest pain. Has had similar episodes but years ago. Does have history of hypothyroidism. Will obtain TSH, troponin, metabolic panel, magnesium level and CBC.   Patient agreeable. DDX-dehydration, electrolyte abnormality, URI, anxiety, anemia, hypothyroidism, ACS/MI, other  Social Determinants (Poverty, Education, uninsured) -none  Prior notes-PMD office visit notes  Name and route all medications-   Charting interpretations all by myself-chest x-ray  Diagnosis descriptions-acute moderate palpitations  Consults-none  Disposition- Considered -     Is this patient to be included in the SEP-1 Core Measure due to severe sepsis or septic shock? No   Exclusion criteria - the patient is NOT to be included for SEP-1 Core Measure due to:  2+ SIRS criteria are not met    ED Course as of 01/30/23 1531   Mon Jan 30, 2023   0848 WBC 6.8, hemoglobin 12.8. [DS]   4195 Patient's sodium 139, potassium 4.1. Creatinine 0.7 with BUN of 21. Magnesium 1.8. WBC 6.8, hemoglobin 12.8. Chest x-ray with no acute cardiopulmonary abnormality. [DS]   6336 TSH elevated at 4.2 with this consistent with patient's history of hypothyroidism. Free T4 1.1. Patient updated on results. No indication for further work-up at this time with patient having normal vital signs and unremarkable blood work. Discussed discharge with outpatient follow-up to primary care physician. Patient agreeable. Return instruction provided all questions answered prior to discharge. [DS]      ED Course User Index  [DS] Neli Cochran MD       I estimate there is LOW risk for ACUTE CORONARY SYNDROME, INTRACRANIAL HEMORRHAGE, MALIGNANT DYSRHYTHMIA, MENINGITIS, PNEUMONIA, PULMONARY EMBOLISM, SEPSIS, SUBARACHNOID HEMORRHAGE, SUBDURAL HEMATOMA, STROKE, or URINARY TRACT INFECTION, thus I consider the discharge disposition reasonable. [address] and I have discussed the diagnosis and risks, and we agree with discharging home to follow-up with their primary doctor. We also discussed returning to the Emergency Department immediately if new or worsening symptoms occur.  We have discussed the symptoms which are most concerning (e.g., changing or worsening pain, weakness, vomiting, fever) that necessitate immediate return. I PERSONALLY SAW THE PATIENT AND PERFORMED A SUBSTANTIVE PORTION OF THE VISIT INCLUDING ALL ASPECTS OF THE MEDICAL DECISION MAKING PROCESS. The primary clinician of record Michelle Ward MD    CRITICAL CARE TIME   Total Critical Caretime was 0 minutes, excluding separately reportable procedures. There was a high probability of clinically significant/life threatening deterioration in the patient's condition which required my urgent intervention. PROCEDURES:  Unlessotherwise noted below, none    FINAL IMPRESSION      1. Palpitations          DISPOSITION/PLAN   DISPOSITION Decision To Discharge 01/30/2023 10:16:19 AM    PATIENT REFERRED TO:  Shivani Leyva MD  [address] [facility] [address]  [place] [phone]    Call in 1 day  For a follow up appointment.     DISCHARGE MEDICATIONS:  Discharge Medication List as of 1/30/2023 10:25 AM             (Please note that portions ofthis note were completed with a voice recognition program.  Efforts were made to edit the dictations but occasionally words are mis-transcribed.)    Michelle Ward MD(electronically signed)  Attending Emergency Physician        Michelle Ward MD  01/30/23 [phone]

## 2023-03-04 ENCOUNTER — APPOINTMENT (OUTPATIENT)
Dept: GENERAL RADIOLOGY | Age: 63
End: 2023-03-04
Payer: COMMERCIAL

## 2023-03-04 ENCOUNTER — HOSPITAL ENCOUNTER (EMERGENCY)
Age: 63
Discharge: HOME | End: 2023-03-05
Attending: EMERGENCY MEDICINE
Payer: COMMERCIAL

## 2023-03-04 DIAGNOSIS — I10 ELEVATED BLOOD PRESSURE READING WITH DIAGNOSIS OF HYPERTENSION: ICD-10-CM

## 2023-03-04 DIAGNOSIS — R00.2 PALPITATIONS: Primary | ICD-10-CM

## 2023-03-04 DIAGNOSIS — R07.89 CHEST PRESSURE: ICD-10-CM

## 2023-03-04 LAB
ALBUMIN SERPL-MCNC: 4 G/DL (ref 3.4–5)
ALP BLD-CCNC: 79 U/L (ref 40–129)
ALT SERPL-CCNC: 16 U/L (ref 10–40)
ANION GAP SERPL CALCULATED.3IONS-SCNC: 11 MMOL/L (ref 3–16)
AST SERPL-CCNC: 17 U/L (ref 15–37)
BASOPHILS ABSOLUTE: 0.1 K/UL (ref 0–0.2)
BASOPHILS RELATIVE PERCENT: 0.6 %
BILIRUB SERPL-MCNC: <0.2 MG/DL (ref 0–1)
BILIRUBIN DIRECT: <0.2 MG/DL (ref 0–0.3)
BILIRUBIN, INDIRECT: NORMAL MG/DL (ref 0–1)
BUN BLDV-MCNC: 16 MG/DL (ref 7–20)
CALCIUM SERPL-MCNC: 9.6 MG/DL (ref 8.3–10.6)
CHLORIDE BLD-SCNC: 102 MMOL/L (ref 99–110)
CO2: 23 MMOL/L (ref 21–32)
CREAT SERPL-MCNC: 0.6 MG/DL (ref 0.6–1.2)
D DIMER: 0.27 UG/ML FEU (ref 0–0.6)
EOSINOPHILS ABSOLUTE: 0.2 K/UL (ref 0–0.6)
EOSINOPHILS RELATIVE PERCENT: 2.3 %
GFR SERPL CREATININE-BSD FRML MDRD: >60 ML/MIN/{1.73_M2}
GLUCOSE BLD-MCNC: 190 MG/DL (ref 70–99)
HCT VFR BLD CALC: 39.1 % (ref 36–48)
HEMOGLOBIN: 13.1 G/DL (ref 12–16)
LIPASE: 19 U/L (ref 13–60)
LYMPHOCYTES ABSOLUTE: 3 K/UL (ref 1–5.1)
LYMPHOCYTES RELATIVE PERCENT: 29.4 %
MCH RBC QN AUTO: 27.7 PG (ref 26–34)
MCHC RBC AUTO-ENTMCNC: 33.4 G/DL (ref 31–36)
MCV RBC AUTO: 82.9 FL (ref 80–100)
MONOCYTES ABSOLUTE: 0.7 K/UL (ref 0–1.3)
MONOCYTES RELATIVE PERCENT: 7.1 %
NEUTROPHILS ABSOLUTE: 6.1 K/UL (ref 1.7–7.7)
NEUTROPHILS RELATIVE PERCENT: 60.6 %
PDW BLD-RTO: 14.5 % (ref 12.4–15.4)
PLATELET # BLD: 331 K/UL (ref 135–450)
PMV BLD AUTO: 8.6 FL (ref 5–10.5)
POTASSIUM REFLEX MAGNESIUM: 3.9 MMOL/L (ref 3.5–5.1)
RBC # BLD: 4.72 M/UL (ref 4–5.2)
SODIUM BLD-SCNC: 136 MMOL/L (ref 136–145)
TOTAL PROTEIN: 7.2 G/DL (ref 6.4–8.2)
TROPONIN: <0.01 NG/ML
WBC # BLD: 10.1 K/UL (ref 4–11)

## 2023-03-04 PROCEDURE — 93005 ELECTROCARDIOGRAM TRACING: CPT | Performed by: EMERGENCY MEDICINE

## 2023-03-04 PROCEDURE — 84484 ASSAY OF TROPONIN QUANT: CPT

## 2023-03-04 PROCEDURE — 85025 COMPLETE CBC W/AUTO DIFF WBC: CPT

## 2023-03-04 PROCEDURE — 36415 COLL VENOUS BLD VENIPUNCTURE: CPT

## 2023-03-04 PROCEDURE — 71045 X-RAY EXAM CHEST 1 VIEW: CPT

## 2023-03-04 PROCEDURE — 83690 ASSAY OF LIPASE: CPT

## 2023-03-04 PROCEDURE — 80076 HEPATIC FUNCTION PANEL: CPT

## 2023-03-04 PROCEDURE — 96374 THER/PROPH/DIAG INJ IV PUSH: CPT

## 2023-03-04 PROCEDURE — 2580000003 HC RX 258: Performed by: PHYSICIAN ASSISTANT

## 2023-03-04 PROCEDURE — 85379 FIBRIN DEGRADATION QUANT: CPT

## 2023-03-04 PROCEDURE — 2500000003 HC RX 250 WO HCPCS: Performed by: PHYSICIAN ASSISTANT

## 2023-03-04 PROCEDURE — 80048 BASIC METABOLIC PNL TOTAL CA: CPT

## 2023-03-04 PROCEDURE — 99285 EMERGENCY DEPT VISIT HI MDM: CPT

## 2023-03-04 RX ORDER — NITROGLYCERIN 0.4 MG/1
0.4 TABLET SUBLINGUAL ONCE
Status: DISCONTINUED | OUTPATIENT
Start: 2023-03-04 | End: 2023-03-05 | Stop reason: HOSPADM

## 2023-03-04 RX ADMIN — Medication 20 MG: at 22:18

## 2023-03-04 ASSESSMENT — PAIN SCALES - GENERAL: PAINLEVEL_OUTOF10: 3

## 2023-03-04 ASSESSMENT — LIFESTYLE VARIABLES
HOW OFTEN DO YOU HAVE A DRINK CONTAINING ALCOHOL: NEVER
HOW MANY STANDARD DRINKS CONTAINING ALCOHOL DO YOU HAVE ON A TYPICAL DAY: PATIENT DOES NOT DRINK

## 2023-03-04 ASSESSMENT — HEART SCORE: ECG: 0

## 2023-03-04 ASSESSMENT — PAIN - FUNCTIONAL ASSESSMENT: PAIN_FUNCTIONAL_ASSESSMENT: 0-10

## 2023-03-05 VITALS
OXYGEN SATURATION: 99 % | HEIGHT: 64 IN | DIASTOLIC BLOOD PRESSURE: 60 MMHG | BODY MASS INDEX: 33.01 KG/M2 | SYSTOLIC BLOOD PRESSURE: 147 MMHG | RESPIRATION RATE: 20 BRPM | TEMPERATURE: 98.2 F | HEART RATE: 69 BPM | WEIGHT: 193.34 LBS

## 2023-03-05 LAB
EKG ATRIAL RATE: 79 BPM
EKG DIAGNOSIS: NORMAL
EKG P AXIS: 46 DEGREES
EKG P-R INTERVAL: 186 MS
EKG Q-T INTERVAL: 404 MS
EKG QRS DURATION: 76 MS
EKG QTC CALCULATION (BAZETT): 463 MS
EKG R AXIS: -22 DEGREES
EKG T AXIS: 71 DEGREES
EKG VENTRICULAR RATE: 79 BPM

## 2023-03-05 PROCEDURE — 93010 ELECTROCARDIOGRAM REPORT: CPT | Performed by: INTERNAL MEDICINE

## 2023-03-05 ASSESSMENT — PAIN - FUNCTIONAL ASSESSMENT: PAIN_FUNCTIONAL_ASSESSMENT: NONE - DENIES PAIN

## 2023-03-05 NOTE — ED PROVIDER NOTES
[address] [facility]        Pt Name: Lisa Orellana  MRN: [id number]  [place] 1960  Date of evaluation: 3/4/2023  Provider: RAMEZ Villafuerte  PCP: Grace Peterson MD  Note Started: 9:57 PM EST     This patient was also seen and evaluated by the attending physician Ezio Hester, [facility]       Chief Complaint   Patient presents with    Chest Pain     C/o 3/10 discomfort. New history of a-fib/flutter has cardiologist appt on 3/16        HISTORY OF PRESENT ILLNESS   (Location, Timing/Onset, Context/Setting, Quality, Duration, Modifying Factors, Severity, Associated Signs and Symptoms)  Note limiting factors. Lisa Orellana is a 63 y.o. female who presents with complaint of palpitations and chest pressure. Patient says that she has been having symptoms like this for couple months now, came to the emergency department for it, saw her family doctor about it, and she has a referral for cardiology but could not get an cardiology appointment until a couple of weeks from now. She says she has been getting the symptoms nearly every day, but today they are at their worst.  Began shortly after she woke up this morning, and there are frequent sensations of heart racing and what seemed like irregular beats, and a pressure-like sensation in the middle of her chest.  She says is not really painful, but she has not found anything that makes it any worse or better. She does have history of indigestion and takes a PPI regularly, says she has been experiencing a little bit of that today. She has been eating normally. She says is a little bit hard to take a deep breath because it makes the pressure sensation worse, but otherwise no difficulty breathing. She denies any cough, cold symptoms or fever. Used to smoke cigarettes but it has been a long time since she has. Denies abdominal pain. Denies any trauma.     Nursing Notes were all reviewed and agreed with or any disagreements were addressed in the HPI.    REVIEW OF SYSTEMS    (2-9 systems for level 4, 10 or more for level 5)     Positives and pertinent negatives as per HPI.     PAST MEDICAL HISTORY     Past Medical History:   Diagnosis Date    Diabetes mellitus type 1 (HCC)     Hyperlipidemia     MRSA (methicillin resistant staph aureus) culture positive 07/29/2019    foot    Thyroid disease        SURGICAL HISTORY     Past Surgical History:   Procedure Laterality Date    BLADDER SUSPENSION      CHOLECYSTECTOMY  october 1991    FOOT DEBRIDEMENT Left 8/1/2019    INCISION AND DRAINAGE DOWN TO AND INCLUDING MUSCLE LEFT FOOT, STAGED PROCEDURE performed by Rob Weber DPM at [facility] OR    FOOT DEBRIDEMENT Left 8/5/2019    DELAYED PRIMARY CLOSURE LEFT FOOT performed by Rob Weber DPM at [facility] OR    HYSTERECTOMY  november 1998    partial    KNEE ARTHROSCOPY      right    OOPHORECTOMY      right    SINUS SURGERY         CURRENTMEDICATIONS       Previous Medications    ASPIRIN 81 MG EC TABLET    Take 1 by mouth every other day    CONTINUOUS BLOOD GLUC  (FREESTYLE JOCY 14 DAY READER) BECCA    Every 2 weeks    CONTINUOUS BLOOD GLUC SENSOR (FREESTYLE JOCY 14 DAY SENSOR) MISC    Every 2 weeks    FREESTYLE LITE STRIP    TEST GLUCOSE TWICE A DAY    IBUPROFEN (ADVIL;MOTRIN) 800 MG TABLET    Take 1 tablet by mouth every 8 hours as needed for Pain    INSULIN GLARGINE (LANTUS SOLOSTAR) 100 UNIT/ML INJECTION PEN    INJECT UNDER THE SKIN 18 UNITS DAILY    INSULIN GLARGINE (LANTUS SOLOSTAR) 100 UNIT/ML INJECTION PEN    INJECT 28 UNITS UNDER THE SKIN DAILY    INSULIN LISPRO, 1 UNIT DIAL, (HUMALOG KWIKPEN) 100 UNIT/ML SOPN    8 units AC TID    INSULIN LISPRO, 1 UNIT DIAL, (HUMALOG KWIKPEN) 100 UNIT/ML SOPN    8-16 units AC TID    INSULIN PEN NEEDLE (B-D UF III MINI PEN NEEDLES) 31G X 5 MM MISC    4 times a day    LEVOTHYROXINE (SYNTHROID) 50 MCG TABLET    TAKE 1 TABLET ON MONDAY,  WEDNESDAY AND FRIDAY    LEVOTHYROXINE (SYNTHROID) 75 MCG TABLET    TAKE 1 TABLET ON TUESDAY, THURSDAY, SATURDAY AND SUNDAY    OMEPRAZOLE (PRILOSEC) 20 MG DELAYED RELEASE CAPSULE    Take 20 mg by mouth Daily    ONDANSETRON (ZOFRAN ODT) 4 MG DISINTEGRATING TABLET    Take 1-2 tablets by mouth every 12 hours as needed for Nausea May Sub regular tablet (non-ODT) if insurance does not cover ODT. SIMVASTATIN (ZOCOR) 40 MG TABLET    TAKE 1 TABLET DAILY       ALLERGIES     Naproxen and Zithromax [azithromycin]    FAMILYHISTORY       Family History   Problem Relation Age of Onset    Stroke Maternal Grandmother     Asthma Maternal Grandmother     Stroke Maternal Grandfather         SOCIAL HISTORY       Social History     Tobacco Use    Smoking status: Former    Smokeless tobacco: Never   Vaping Use    Vaping Use: Never used   Substance Use Topics    Alcohol use: No     Alcohol/week: 0.0 standard drinks    Drug use: No       SCREENINGS      Heart Score for chest pain patients  History: Slightly Suspicious  ECG: Normal  Patient Age: > 39 and < 65 years  *Risk factors for Atherosclerotic disease: Cigarette smoking, Diabetes Mellitus, Obesity  Risk Factors: > 3 Risk factors or history of atherosclerotic disease*  Troponin: < 1X normal limit  Heart Score Total: 3    PHYSICAL EXAM    (up to 7 for level 4, 8 or more for level 5)     ED Triage Vitals   BP Temp Temp Source Heart Rate Resp SpO2 Height Weight   03/04/23 2052 03/04/23 2052 03/04/23 2052 03/04/23 2052 03/04/23 2052 03/04/23 2052 03/04/23 2050 03/04/23 2050   (!) 173/87 98.2 °F (36.8 °C) Oral 92 20 97 % 5' 4\" (1.626 m) 193 lb 5.5 oz (87.7 kg)       Physical Exam  Vitals and nursing note reviewed. Constitutional:       General: She is not in acute distress. Appearance: Normal appearance. She is not ill-appearing. HENT:      Head: Normocephalic and atraumatic. Nose: Nose normal.   Eyes:      General:         Right eye: No discharge.          Left eye: No discharge. Cardiovascular:      Rate and Rhythm: Normal rate and regular rhythm. Heart sounds: Normal heart sounds. Pulmonary:      Effort: Pulmonary effort is normal. No respiratory distress. Breath sounds: Normal breath sounds. No stridor. No wheezing, rhonchi or rales. Abdominal:      General: Bowel sounds are normal. There is no distension. Palpations: Abdomen is soft. There is no mass. Tenderness: There is no abdominal tenderness. There is no guarding or rebound. Musculoskeletal:         General: Normal range of motion. Cervical back: Normal range of motion. Skin:     General: Skin is warm and dry. Neurological:      General: No focal deficit present. Mental Status: She is alert and oriented to person, place, and time. Psychiatric:         Mood and Affect: Mood normal.         Behavior: Behavior normal.       DIAGNOSTIC RESULTS   LABS:    Labs Reviewed   BASIC METABOLIC PANEL W/ REFLEX TO MG FOR LOW K - Abnormal; Notable for the following components:       Result Value    Glucose 190 (*)     All other components within normal limits   CBC WITH AUTO DIFFERENTIAL   TROPONIN   D-DIMER, QUANTITATIVE   LIPASE   HEPATIC FUNCTION PANEL       When ordered only abnormal lab results are displayed. All other labs were within normal range or not returned as of this dictation. EKG: When ordered, EKG's are interpreted by the Emergency Department Physician in the absence of a cardiologist.  Please see their note for interpretation of EKG. RADIOLOGY:   All images such as plain radiographs, CT, Ultrasound and MRI are interpreted by a radiologist. Some images are visualized and preliminarily interpreted by me and/or the ED attending physician. Interpretation per the radiologist below, if available at the time of this note:    XR CHEST PORTABLE   Final Result   No acute process. CONSULTS:  None    PROCEDURES   Unless otherwise noted below, none. Procedures    EMERGENCY DEPARTMENT COURSE and DIFFERENTIAL DIAGNOSIS/MDM:   Vitals:    Vitals:    03/04/23 2052 03/04/23 2203 03/04/23 2215 03/04/23 2233   BP: (!) 173/87 (!) 154/76 (!) 151/75 (!) 152/70   Pulse: 92 93 78 77   Resp: 20      Temp: 98.2 °F (36.8 °C)      TempSrc: Oral      SpO2: 97% 97% 98% 99%   Weight:       Height:           Patient was given the following medications:  Medications   nitroGLYCERIN (NITROSTAT) SL tablet 0.4 mg (has no administration in time range)   famotidine (PEPCID) 20 mg in sodium chloride (PF) 0.9 % 10 mL injection (20 mg IntraVENous Given 3/4/23 2218)           Is this patient to be included in the SEP-1 Core Measure due to severe sepsis or septic shock? No   Exclusion criteria - the patient is NOT to be included for SEP-1 Core Measure due to: Infection is not suspected    Patient's physical exam was normal, and exam by ED attending Dr. El Burrell did find her chest pain to be reproducible upon palpitation. She was slightly hypertensive in the ED but otherwise vital signs normal.  She denied chest pain, saying it was more pressure-like and palpitations. EKG was nonconcerning. Laboratory work-up was reassuring, including a negative troponin. Chest x-ray no acute findings. D-dimer negative. Patient does have an appointment coming up with cardiology in 12 days, and suspicion for ACS or any other acute cardiopulmonary event now was low. Heart score is 3. No indication for hospital admission at this time. She will be discharged with instructions to reduce any stimulant medications and keep her cardiology appointment. The patient verbalized understanding and agreement with this plan of care. The patient was advised to return to the emergency department if symptoms should significantly worsen or if new and concerning symptoms should appear.      I estimate there is LOW risk for ACUTE CORONARY SYNDROME, PULMONARY EMBOLISM, CARDIAC TAMPONADE, MYOCARDITIS, PERICARDITIS, STROKE, TRANSIENT ISCHEMIC ATTACK, THORACIC AORTIC DISSECTION, HEMORRHAGE, OR CRITICAL CARDIAC ARRHYTHMIA, thus I consider the discharge disposition reasonable. CRITICAL CARE TIME   none    FINAL IMPRESSION      1. Palpitations    2. Chest pressure    3.  Elevated blood pressure reading with diagnosis of hypertension          DISPOSITION/PLAN   DISPOSITION Decision To Discharge 03/04/2023 11:58:43 PM      PATIENT REFERRED TO:  your cardiologist    Go to   Keep your upcoming appointment    Rachel Pugh MD  [address] [facility] [address]  [place] [phone]    Call   As needed, For follow-up care    DISCHARGE MEDICATIONS:  New Prescriptions    No medications on file       DISCONTINUED MEDICATIONS:  Discontinued Medications    No medications on file            (Please note that portions of this note were completed with a voice recognition program.  Efforts were made to edit the dictations but occasionally words are mis-transcribed.)    RAMEZ Oseguera (electronically signed)       RAMEZ Oseguera  03/05/23 0002

## 2023-03-05 NOTE — ED PROVIDER NOTES
Panel Management Review      Patient has the following on her problem list:     Diabetes    ASA: Passed    Last A1C  Lab Results   Component Value Date    A1C 8.2 03/05/2019    A1C 8.1 08/24/2018    A1C 8.8 05/24/2018    A1C 6.7 03/22/2018    A1C 6.8 12/22/2017     A1C tested: FAILED    Last LDL:    Lab Results   Component Value Date    CHOL 122 12/22/2017     Lab Results   Component Value Date    HDL 35 12/22/2017     Lab Results   Component Value Date    LDL 72 12/22/2017     Lab Results   Component Value Date    TRIG 73 12/22/2017     Lab Results   Component Value Date    CHOLHDLRATIO 3.2 11/04/2015     Lab Results   Component Value Date    NHDL 87 12/22/2017       Is the patient on a Statin? NO             Is the patient on Aspirin? YES    Medications     HMG CoA Reductase Inhibitors     STATIN NOT PRESCRIBED, INTENTIONAL,       Salicylates     aspirin 81 MG tablet             Last three blood pressure readings:  BP Readings from Last 3 Encounters:   05/01/19 128/84   03/29/19 131/86   03/13/19 129/82       Date of last diabetes office visit: 3/5/2019     Tobacco History:     History   Smoking Status     Former Smoker     Packs/day: 1.00     Years: 15.00     Types: Cigarettes     Start date: 1/1/1985     Quit date: 7/1/1998   Smokeless Tobacco     Never Used     Comment: soke free household.         Hypertension   Last three blood pressure readings:  BP Readings from Last 3 Encounters:   05/01/19 128/84   03/29/19 131/86   03/13/19 129/82     Blood pressure: FAILED    HTN Guidelines:  Less than 140/90      Composite cancer screening  Chart review shows that this patient is due/due soon for the following Mammogram  Summary:    Patient is due/failing the following:   A1C, DAP, LDL and MAMMOGRAM    Action needed:   None patient has diabetes follow up on 6/5/2019    Type of outreach:    None, patient has diabetes follow up on 6/5/2019    Questions for provider review:    None                                         [facility]      Pt Name: Richmond Cárdenas  MRN: [id number]  [place] 1960  Date of evaluation: 3/4/2023  Provider: Zeus Pin[facility]  Chief Complaint   Patient presents with    Chest Pain     C/o 3/10 discomfort. New history of a-fib/flutter has cardiologist appt on 3/16        I have fully participated in the care of Richmond Cárdenas and have had a face-to-face evaluation. I have reviewed and agree with all pertinent clinical information, and midlevel provider's history, and physical exam. I have also reviewed the labs, EKG, and imaging studies and treatment plan. I have also reviewed and agree with the medications, allergies and past medical history section for this Richmond Cárdenas. I agree with the diagnosis, and I concur. This patient is at risk for a communicable infection. Therefore, personal protection equipment consisting of a mask was worn for the exam.    Past Medical History:   Diagnosis Date    Diabetes mellitus type 1 ([address]     Hyperlipidemia     MRSA (methicillin resistant staph aureus) culture positive 07/29/2019    foot    Thyroid disease        MDM:  Richmond Cárdenas is a 63 y.o. female who presents with palpitations that she described as all of the above when asked whether it was fast slow or hard. Patient has had a history of this. She is scheduled to see cardiology this month. That is in approximately 2 weeks. She denies nausea vomiting. She states the fluttering increased today. She currently takes a lot of sinus medicines. She also drinks caffeine in the morning and evening. She denies use of any weight loss medicines. She states there is a heaviness in her chest.  She takes Prilosec and has not changed the dose of that. Nothing makes it better or worse. She describes it as moderate. Physical exam is unremarkable.   Heart is regular rate and rhythm without                                                                                             LS     Chart routed to none .           murmurs clicks or rubs. Chest pain is reproducible with palpation of the parasternal area on the left lungs are clear to auscultation equal bilaterally abdomen soft and nontender. Cardiac monitor reveals frequent PACs when in the room but otherwise while we were observing it there were no PACs. However, there was 1 PVC on her EKG. The remainder of her work-up was negative. Therefore, I feel it is safe to discharge the patient to follow-up with cardiology as scheduled. She was instructed return emergency primary for any problems. I predict that she will receive a loop recorder in the near future. Vitals:    03/05/23 0003   BP: (!) 147/60   Pulse: 69   Resp:    Temp:    SpO2: 99%       Lab results  Labs Reviewed   BASIC METABOLIC PANEL W/ REFLEX TO MG FOR LOW K - Abnormal; Notable for the following components:       Result Value    Glucose 190 (*)     All other components within normal limits   CBC WITH AUTO DIFFERENTIAL   TROPONIN   D-DIMER, QUANTITATIVE   LIPASE   HEPATIC FUNCTION PANEL       EKG Results  EKG Interpretation    Interpreted by emergency department physician  Time performed: 2119  Time read: 2119    Rhythm: Sinus  Ventricular Rate: 79  QRS Axis: -22  Ectopy: 1 PVC  Conduction: Normal sinus rhythm with 1 PVC. No PACs are noted on the EKG  ST Segments: normal  T Waves: normal  Q Waves: None    Other findings: Motion artifact but EKG is readable    Compared to EKG on: 1/30/2023 and appears unchanged except for the PVC    Clinical Impression: Normal sinus rhythm with 1 PVC. There are no PACs on the EKG. There were PACs noted on the cardiac monitor. There is motion artifact but EKG is readable. This is compared to an EKG on 1/30/2023 and appears unchanged except for the 1 PVC. Gretchen 149, DO    Radiology results  XR CHEST PORTABLE   Final Result   No acute process.              Diagnostic considerations include but are not limited to:  myocardial infarction, pulmonary embolus, pneumothorax, pneumonia, aortic dissection, empyema, musculoskeletal chest pain, pulmonary contusion, pericardial effusion, pericarditis, GERD, pancreatitis, stomach ulcer, and referred abdominal pain. EKG-ordered to rule out myocardial infarction, rhythm disturbances, conduction disturbances, LVH, DOUG, pericarditis, voltage abnormalities, or other pathology that might be causing the patient's symptoms. Chest x-ray-chest x-ray was ordered to rule out pneumonia, pneumothorax, congestive heart failure, cardiomegaly, chest masses, aortic aneurysm, hiatal hernia, rib fractures, or any other pathology that might be causing the patient's symptoms    CBC-CBC was ordered to rule out anemia, infection, abnormal platelet count, polycythemia, abnormal Red cell pathology, or any other pathology that might be causing the patient's symptoms    CMP-CMP was ordered to rule out electrolyte abnormalities, liver dysfunction, kidney dysfunction, electrolyte imbalance, abnormal transaminases, or any other pathology that might be causing the patient's symptoms. See discharge instructions for specific medications, discharge information, and treatments. They were verbally instructed to return to emergency if any problems. Medications   famotidine (PEPCID) 20 mg in sodium chloride (PF) 0.9 % 10 mL injection (20 mg IntraVENous Given 3/4/23 2218)       Discharge Medication List as of 3/5/2023 12:01 AM          The patient's blood pressure was found to be elevated according to CMS/Medicare and the Affordable Care Act/[facility] criteria. Elevated blood pressure could occur because of pain or anxiety or other reasons and does not mean that they need to have their blood pressure treated or medications otherwise adjusted. However, this could also be a sign that they will need to have their blood pressure treated or medications changed.     The patient was instructed to follow up closely with their personal physician to have their blood pressure rechecked. The patient was instructed to take a list of recent blood pressure readings to their next visit with their personal physician. IMPRESSION(S):  1. Palpitations    2. Chest pressure    3. Elevated blood pressure reading with diagnosis of hypertension      ?          Dawn Fischer DO  03/05/23 0002

## 2024-12-22 SDOH — HEALTH STABILITY: PHYSICAL HEALTH: ON AVERAGE, HOW MANY MINUTES DO YOU ENGAGE IN EXERCISE AT THIS LEVEL?: 0 MIN

## 2024-12-22 SDOH — HEALTH STABILITY: PHYSICAL HEALTH: ON AVERAGE, HOW MANY DAYS PER WEEK DO YOU ENGAGE IN MODERATE TO STRENUOUS EXERCISE (LIKE A BRISK WALK)?: 0 DAYS

## 2024-12-23 ENCOUNTER — OFFICE VISIT (OUTPATIENT)
Dept: FAMILY MEDICINE CLINIC | Age: 64
End: 2024-12-23
Payer: COMMERCIAL

## 2024-12-23 VITALS
HEART RATE: 57 BPM | TEMPERATURE: 97.8 F | DIASTOLIC BLOOD PRESSURE: 84 MMHG | HEIGHT: 64 IN | OXYGEN SATURATION: 97 % | BODY MASS INDEX: 32.78 KG/M2 | SYSTOLIC BLOOD PRESSURE: 152 MMHG | WEIGHT: 192 LBS

## 2024-12-23 DIAGNOSIS — M79.89 LEG SWELLING: ICD-10-CM

## 2024-12-23 DIAGNOSIS — H93.13 TINNITUS OF BOTH EARS: ICD-10-CM

## 2024-12-23 DIAGNOSIS — I10 PRIMARY HYPERTENSION: ICD-10-CM

## 2024-12-23 DIAGNOSIS — E10.9 TYPE 1 DIABETES MELLITUS WITHOUT COMPLICATION (HCC): ICD-10-CM

## 2024-12-23 DIAGNOSIS — Z76.89 ENCOUNTER TO ESTABLISH CARE: Primary | ICD-10-CM

## 2024-12-23 PROCEDURE — 3077F SYST BP >= 140 MM HG: CPT | Performed by: NURSE PRACTITIONER

## 2024-12-23 PROCEDURE — 3079F DIAST BP 80-89 MM HG: CPT | Performed by: NURSE PRACTITIONER

## 2024-12-23 PROCEDURE — 99214 OFFICE O/P EST MOD 30 MIN: CPT | Performed by: NURSE PRACTITIONER

## 2024-12-23 RX ORDER — CRANBERRY FRUIT EXTRACT 250 MG
CAPSULE ORAL
COMMUNITY

## 2024-12-23 RX ORDER — ACYCLOVIR 400 MG/1
TABLET ORAL
COMMUNITY

## 2024-12-23 RX ORDER — CETIRIZINE HYDROCHLORIDE 10 MG/1
10 TABLET ORAL DAILY
COMMUNITY

## 2024-12-23 RX ORDER — OMEPRAZOLE 40 MG/1
40 CAPSULE, DELAYED RELEASE ORAL DAILY
COMMUNITY

## 2024-12-23 RX ORDER — HYDROCHLOROTHIAZIDE 25 MG/1
25 TABLET ORAL EVERY MORNING
Qty: 90 TABLET | Refills: 0 | Status: SHIPPED | OUTPATIENT
Start: 2024-12-23

## 2024-12-23 RX ORDER — LOSARTAN POTASSIUM 50 MG/1
50 TABLET ORAL DAILY
COMMUNITY

## 2024-12-23 RX ORDER — MINOCYCLINE HYDROCHLORIDE 50 MG/1
50 CAPSULE ORAL 2 TIMES DAILY
COMMUNITY

## 2024-12-23 SDOH — ECONOMIC STABILITY: FOOD INSECURITY: WITHIN THE PAST 12 MONTHS, YOU WORRIED THAT YOUR FOOD WOULD RUN OUT BEFORE YOU GOT MONEY TO BUY MORE.: NEVER TRUE

## 2024-12-23 SDOH — ECONOMIC STABILITY: INCOME INSECURITY: HOW HARD IS IT FOR YOU TO PAY FOR THE VERY BASICS LIKE FOOD, HOUSING, MEDICAL CARE, AND HEATING?: NOT VERY HARD

## 2024-12-23 SDOH — ECONOMIC STABILITY: FOOD INSECURITY: WITHIN THE PAST 12 MONTHS, THE FOOD YOU BOUGHT JUST DIDN'T LAST AND YOU DIDN'T HAVE MONEY TO GET MORE.: NEVER TRUE

## 2024-12-23 ASSESSMENT — PATIENT HEALTH QUESTIONNAIRE - PHQ9
SUM OF ALL RESPONSES TO PHQ QUESTIONS 1-9: 0
2. FEELING DOWN, DEPRESSED OR HOPELESS: NOT AT ALL
SUM OF ALL RESPONSES TO PHQ QUESTIONS 1-9: 0
SUM OF ALL RESPONSES TO PHQ9 QUESTIONS 1 & 2: 0
1. LITTLE INTEREST OR PLEASURE IN DOING THINGS: NOT AT ALL

## 2024-12-23 ASSESSMENT — ENCOUNTER SYMPTOMS
SHORTNESS OF BREATH: 0
ABDOMINAL PAIN: 0
COUGH: 0

## 2024-12-23 NOTE — PROGRESS NOTES
Padmini Luciano (:  1960) is a 64 y.o. female,New patient, here for evaluation of the following chief complaint(s):  New Patient (Np to get established from Dr Osuna ) and Hypertension (Pt was increased to 50 mg of losartan 2 weeks ago and her BP is still running high. Dr Osuna increased it to protect her kidneys and not for BP. Pt is swelling from this dosage. )      Assessment & Plan   ASSESSMENT/PLAN:  1. Encounter to establish care  Reviewed PMH meds and labs    2. Type 1 diabetes mellitus without complication (HCC)  Stable. A1c 7 per dexocm meter in office today. Continue insulin pump and routine follow ups with Endocrinology.    3. Primary hypertension  Not controlled. Add water pill, return in 1 wk to check BP.   - hydroCHLOROthiazide (HYDRODIURIL) 25 MG tablet; Take 1 tablet by mouth every morning  Dispense: 90 tablet; Refill: 0    4. Leg swelling  New. Add hctz, follow pt education included.   - hydroCHLOROthiazide (HYDRODIURIL) 25 MG tablet; Take 1 tablet by mouth every morning  Dispense: 90 tablet; Refill: 0    5. Tinnitus of both ears  New. Discussed may be related to increased BP. Follow pt education included in AVS       Return in about 3 months (around 3/23/2025) for HTN follow up.         Subjective   SUBJECTIVE/OBJECTIVE:  HPI  Presents today to est care PMH sig for T1DM, GERD, and HTN  Cardiac-endorses leg swelling, taking losartan 50mg daily for the last 2 wks. States the swelling started after increasing the med. States overall eats low salt diet, random snacks. Endorses HA, may be sinus. BP at home 140s/70s.  Denies Cp, sob, palpitations, nose bleeds.   K6OH-RvDr. Hampton at [facility] endocrin, started with insulin pump 5 wks ago, dexcom cont monitor. DM for 35 yrs. 14 day glucose average 155, A1c 7.  Works special ed ,   Lives at home with , independent adls.   ENT-luzmaria tinnitus lately      Current Outpatient Medications   Medication Sig Dispense Refill

## 2025-01-26 ENCOUNTER — PATIENT MESSAGE (OUTPATIENT)
Dept: FAMILY MEDICINE CLINIC | Age: 65
End: 2025-01-26

## 2025-01-27 DIAGNOSIS — K21.9 GASTROESOPHAGEAL REFLUX DISEASE WITHOUT ESOPHAGITIS: Primary | ICD-10-CM

## 2025-01-27 NOTE — TELEPHONE ENCOUNTER
Please advise on this RX request. Not the original prescriber.   Last OV: 12- new patient   Next OV: 3-

## 2025-01-28 RX ORDER — OMEPRAZOLE 40 MG/1
40 CAPSULE, DELAYED RELEASE ORAL DAILY
Qty: 30 CAPSULE | Refills: 1 | Status: SHIPPED | OUTPATIENT
Start: 2025-01-28

## 2025-02-26 ENCOUNTER — TELEPHONE (OUTPATIENT)
Dept: FAMILY MEDICINE CLINIC | Age: 65
End: 2025-02-26

## 2025-02-26 NOTE — TELEPHONE ENCOUNTER
----- Message from Sheldon DOCKERY sent at 2/26/2025  4:26 PM EST -----  Regarding: [facility] Appointment Request  [facility] Appointment Request    Patient needs appointment for [facility] Appointment Type: New Patient.    Patient Requested Dates(s):First available   Patient Requested Time: Anytime   Provider Name:Joseph Stephany LEO BARKER NP    Patient will have her surgery on March 27, 2025. Patient need an appointment 2 weeks before her surgery.    Reason for Appointment Request: Established Patient - Available appointments did not meet patient need  --------------------------------------------------------------------------------------------------------------------------    Relationship to Patient: Self     Call Back Information: OK to leave message on voicemail  Preferred Call Back Number: Phone [phone]

## 2025-02-28 NOTE — TELEPHONE ENCOUNTER
Left message to call back. Can do prop at 3 month check up she already has scheduled on the 19th of March

## 2025-03-19 ENCOUNTER — OFFICE VISIT (OUTPATIENT)
Dept: FAMILY MEDICINE CLINIC | Age: 65
End: 2025-03-19
Payer: COMMERCIAL

## 2025-03-19 ENCOUNTER — RESULTS FOLLOW-UP (OUTPATIENT)
Dept: FAMILY MEDICINE CLINIC | Age: 65
End: 2025-03-19

## 2025-03-19 VITALS
SYSTOLIC BLOOD PRESSURE: 132 MMHG | OXYGEN SATURATION: 99 % | WEIGHT: 189.6 LBS | HEART RATE: 57 BPM | HEIGHT: 64 IN | TEMPERATURE: 97.3 F | BODY MASS INDEX: 32.37 KG/M2 | DIASTOLIC BLOOD PRESSURE: 66 MMHG

## 2025-03-19 DIAGNOSIS — I10 PRIMARY HYPERTENSION: ICD-10-CM

## 2025-03-19 DIAGNOSIS — M17.11 OSTEOARTHRITIS OF RIGHT KNEE, UNSPECIFIED OSTEOARTHRITIS TYPE: ICD-10-CM

## 2025-03-19 DIAGNOSIS — E03.9 ACQUIRED HYPOTHYROIDISM: ICD-10-CM

## 2025-03-19 DIAGNOSIS — K21.9 GASTROESOPHAGEAL REFLUX DISEASE WITHOUT ESOPHAGITIS: ICD-10-CM

## 2025-03-19 DIAGNOSIS — E10.9 TYPE 1 DIABETES MELLITUS WITHOUT COMPLICATION: ICD-10-CM

## 2025-03-19 DIAGNOSIS — Z01.818 PREOP EXAMINATION: Primary | ICD-10-CM

## 2025-03-19 DIAGNOSIS — Z01.818 PREOP EXAMINATION: ICD-10-CM

## 2025-03-19 LAB
25(OH)D3 SERPL-MCNC: 20.7 NG/ML
ALBUMIN SERPL-MCNC: 4.6 G/DL (ref 3.4–5)
ALBUMIN/GLOB SERPL: 1.8 {RATIO} (ref 1.1–2.2)
ALP SERPL-CCNC: 82 U/L (ref 40–129)
ALT SERPL-CCNC: 31 U/L (ref 10–40)
ANION GAP SERPL CALCULATED.3IONS-SCNC: 13 MMOL/L (ref 3–16)
AST SERPL-CCNC: 31 U/L (ref 15–37)
BACTERIA URNS QL MICRO: ABNORMAL /HPF
BILIRUB SERPL-MCNC: 0.3 MG/DL (ref 0–1)
BILIRUB UR QL STRIP.AUTO: NEGATIVE
BUN SERPL-MCNC: 19 MG/DL (ref 7–20)
CALCIUM SERPL-MCNC: 10.1 MG/DL (ref 8.3–10.6)
CHLORIDE SERPL-SCNC: 101 MMOL/L (ref 99–110)
CHOLEST SERPL-MCNC: 187 MG/DL (ref 0–199)
CLARITY UR: CLEAR
CO2 SERPL-SCNC: 27 MMOL/L (ref 21–32)
COLOR UR: YELLOW
CREAT SERPL-MCNC: 0.8 MG/DL (ref 0.6–1.2)
CREAT UR-MCNC: 76.2 MG/DL (ref 28–259)
DEPRECATED RDW RBC AUTO: 14.5 % (ref 12.4–15.4)
EPI CELLS #/AREA URNS AUTO: 12 /HPF (ref 0–5)
EST. AVERAGE GLUCOSE BLD GHB EST-MCNC: 148.5 MG/DL
GFR SERPLBLD CREATININE-BSD FMLA CKD-EPI: 83 ML/MIN/{1.73_M2}
GLUCOSE SERPL-MCNC: 96 MG/DL (ref 70–99)
GLUCOSE UR STRIP.AUTO-MCNC: NEGATIVE MG/DL
HBA1C MFR BLD: 6.8 %
HCT VFR BLD AUTO: 40.9 % (ref 36–48)
HDLC SERPL-MCNC: 64 MG/DL (ref 40–60)
HGB BLD-MCNC: 13.3 G/DL (ref 12–16)
HGB UR QL STRIP.AUTO: NEGATIVE
HYALINE CASTS #/AREA URNS AUTO: 0 /LPF (ref 0–8)
INR PPP: 0.93 (ref 0.85–1.15)
KETONES UR STRIP.AUTO-MCNC: NEGATIVE MG/DL
LDL CHOLESTEROL: 108 MG/DL
LEUKOCYTE ESTERASE UR QL STRIP.AUTO: ABNORMAL
MCH RBC QN AUTO: 27.9 PG (ref 26–34)
MCHC RBC AUTO-ENTMCNC: 32.6 G/DL (ref 31–36)
MCV RBC AUTO: 85.6 FL (ref 80–100)
MICROALBUMIN UR DL<=1MG/L-MCNC: <1.2 MG/DL
MICROALBUMIN/CREAT UR: NORMAL MG/G (ref 0–30)
NITRITE UR QL STRIP.AUTO: NEGATIVE
PH UR STRIP.AUTO: 6.5 [PH] (ref 5–8)
PLATELET # BLD AUTO: 346 K/UL (ref 135–450)
PMV BLD AUTO: 8.6 FL (ref 5–10.5)
POTASSIUM SERPL-SCNC: 4 MMOL/L (ref 3.5–5.1)
PROT SERPL-MCNC: 7.2 G/DL (ref 6.4–8.2)
PROT UR STRIP.AUTO-MCNC: NEGATIVE MG/DL
PROTHROMBIN TIME: 12.6 SEC (ref 11.9–14.9)
RBC # BLD AUTO: 4.78 M/UL (ref 4–5.2)
RBC CLUMPS #/AREA URNS AUTO: 1 /HPF (ref 0–4)
SODIUM SERPL-SCNC: 141 MMOL/L (ref 136–145)
SP GR UR STRIP.AUTO: 1.02 (ref 1–1.03)
TRIGL SERPL-MCNC: 77 MG/DL (ref 0–150)
TSH SERPL DL<=0.005 MIU/L-ACNC: 2.62 UIU/ML (ref 0.27–4.2)
UA COMPLETE W REFLEX CULTURE PNL UR: ABNORMAL
UA DIPSTICK W REFLEX MICRO PNL UR: YES
URN SPEC COLLECT METH UR: ABNORMAL
UROBILINOGEN UR STRIP-ACNC: 0.2 E.U./DL
VLDLC SERPL CALC-MCNC: 15 MG/DL
WBC # BLD AUTO: 7.2 K/UL (ref 4–11)
WBC #/AREA URNS AUTO: 1 /HPF (ref 0–5)

## 2025-03-19 PROCEDURE — 93000 ELECTROCARDIOGRAM COMPLETE: CPT | Performed by: NURSE PRACTITIONER

## 2025-03-19 PROCEDURE — 3075F SYST BP GE 130 - 139MM HG: CPT | Performed by: NURSE PRACTITIONER

## 2025-03-19 PROCEDURE — 3078F DIAST BP <80 MM HG: CPT | Performed by: NURSE PRACTITIONER

## 2025-03-19 PROCEDURE — 99214 OFFICE O/P EST MOD 30 MIN: CPT | Performed by: NURSE PRACTITIONER

## 2025-03-19 RX ORDER — FEXOFENADINE HCL 180 MG/1
180 TABLET ORAL DAILY
COMMUNITY

## 2025-03-19 SDOH — ECONOMIC STABILITY: FOOD INSECURITY: WITHIN THE PAST 12 MONTHS, THE FOOD YOU BOUGHT JUST DIDN'T LAST AND YOU DIDN'T HAVE MONEY TO GET MORE.: NEVER TRUE

## 2025-03-19 SDOH — ECONOMIC STABILITY: FOOD INSECURITY: WITHIN THE PAST 12 MONTHS, YOU WORRIED THAT YOUR FOOD WOULD RUN OUT BEFORE YOU GOT MONEY TO BUY MORE.: NEVER TRUE

## 2025-03-19 ASSESSMENT — ENCOUNTER SYMPTOMS
RHINORRHEA: 0
CONSTIPATION: 0
VOMITING: 0
SORE THROAT: 0
DIARRHEA: 0
CHEST TIGHTNESS: 0
TROUBLE SWALLOWING: 0
COLOR CHANGE: 0
NAUSEA: 0
ABDOMINAL PAIN: 0
APNEA: 0
COUGH: 1
SHORTNESS OF BREATH: 0
WHEEZING: 0
BACK PAIN: 1

## 2025-03-19 ASSESSMENT — PATIENT HEALTH QUESTIONNAIRE - PHQ9
SUM OF ALL RESPONSES TO PHQ QUESTIONS 1-9: 0
1. LITTLE INTEREST OR PLEASURE IN DOING THINGS: NOT AT ALL
SUM OF ALL RESPONSES TO PHQ QUESTIONS 1-9: 0
2. FEELING DOWN, DEPRESSED OR HOPELESS: NOT AT ALL

## 2025-03-19 NOTE — PROGRESS NOTES
PRE-OP HISTORY AND PHYSICAL             Date: 3/19/2025        Patient Name: Padmini Luciano     YOB: 1960      Age:  64 y.o.    Chief Complaint     Chief Complaint   Patient presents with    Pre-op Exam     RIGHT TOTAL KNEE REPLACEMENT/ARTHROPLASTY with Dr. Kervin Bermudez on 3/27/2025  fax: [phone]          History Obtained From   Patient    History of Present Illness   Presents to office today for pre-op clearance for RIGHT TOTAL KNEE REPLACEMENT/ARTHROPLASTY with Dr. Kervin Bermudez on 3/27/2025.  Hx of partial right knee, states the cement has broke away twice. Pt has constant pain and wears a brace for support.   Once a month sugars will spike, not sure why, continuous insulin pump and glucose monitor, Dr. Thurman is aware, pt A1c on dexcom 6.8%.  Past Medical History     Past Medical History:   Diagnosis Date    Diabetes mellitus type 1 (HCC)     Headache     always have headaches/sinus issues    Hyperlipidemia     Hypertension 10/2024    Hypothyroidism not sure when I was first diagnosed    MRSA (methicillin resistant staph aureus) culture positive 2019    foot    Thyroid disease         Past Surgical History     Past Surgical History:   Procedure Laterality Date    BLADDER SUSPENSION       SECTION      CHOLECYSTECTOMY  10/1991    FOOT DEBRIDEMENT Left 2019    INCISION AND DRAINAGE DOWN TO AND INCLUDING MUSCLE LEFT FOOT, STAGED PROCEDURE performed by Rob Weber DPM at [facility] OR    FOOT DEBRIDEMENT Left 2019    DELAYED PRIMARY CLOSURE LEFT FOOT performed by Rob Weber DPM at [facility] OR    HYSTERECTOMY (CERVIX STATUS UNKNOWN)  1998    partial    HYSTERECTOMY, TOTAL ABDOMINAL (CERVIX REMOVED)      I had a partial hysterectomy leaving both ovaries    JOINT REPLACEMENT      partial right knee    KNEE ARTHROSCOPY      right    OVARY REMOVAL      right    SINUS SURGERY          Medications Prior to Admission     Prior to Admission

## 2025-03-19 NOTE — PATIENT INSTRUCTIONS
Thank you for choosing [facility].    Please bring a current list of medications to every appointment.    Please contact your pharmacy for any prescription refill(s) that you are requesting.

## 2025-03-24 ENCOUNTER — TELEPHONE (OUTPATIENT)
Dept: FAMILY MEDICINE CLINIC | Age: 65
End: 2025-03-24

## 2025-03-24 RX ORDER — NITROFURANTOIN 25; 75 MG/1; MG/1
100 CAPSULE ORAL 2 TIMES DAILY
Qty: 14 CAPSULE | Refills: 0 | Status: SHIPPED | OUTPATIENT
Start: 2025-03-24 | End: 2025-03-31

## 2025-03-24 NOTE — TELEPHONE ENCOUNTER
Pt calling she is having knee replacement surgery on 3/27/2025 and her surgeons office called an it looks like the Urine Culture result from 3/19/2025 says she might have a UTI it shows Moderate Leukocytes. The surgeon is asking if pt can be put on an antibiotic just in case it is the start of a UTI    [phone]     [facility]

## 2025-03-24 NOTE — TELEPHONE ENCOUNTER
Can we call and see if they did a culture that way we can make sure we are treating it appropriately.  Looks like it was to be reflexed to culture?  I will call her in an antibiotic pending results, not sure when her sure sure he is scheduled for question [phone]

## 2025-03-25 RX ORDER — CEFUROXIME AXETIL 250 MG/1
250 TABLET ORAL 2 TIMES DAILY
Qty: 10 TABLET | Refills: 0 | Status: SHIPPED | OUTPATIENT
Start: 2025-03-25 | End: 2025-03-30

## 2025-03-25 NOTE — TELEPHONE ENCOUNTER
Called the lab, the urine culture was not indicated therefore not done. Not able to add on a culture.   Surgery scheduled for 3/27/25.

## 2025-03-25 NOTE — TELEPHONE ENCOUNTER
I guess it is not much more I can do but go ahead and treated.  I will call in some Ceftin 250 twice daily for 5 days

## 2025-04-03 DIAGNOSIS — K21.9 GASTROESOPHAGEAL REFLUX DISEASE WITHOUT ESOPHAGITIS: ICD-10-CM

## 2025-04-03 RX ORDER — OMEPRAZOLE 40 MG/1
40 CAPSULE, DELAYED RELEASE ORAL DAILY
Qty: 30 CAPSULE | Refills: 2 | Status: SHIPPED | OUTPATIENT
Start: 2025-04-03

## 2025-04-08 ENCOUNTER — PATIENT MESSAGE (OUTPATIENT)
Dept: FAMILY MEDICINE CLINIC | Age: 65
End: 2025-04-08

## 2025-04-08 DIAGNOSIS — M79.89 LEG SWELLING: ICD-10-CM

## 2025-04-08 DIAGNOSIS — I10 PRIMARY HYPERTENSION: Primary | ICD-10-CM

## 2025-04-08 RX ORDER — HYDROCHLOROTHIAZIDE 25 MG/1
25 TABLET ORAL EVERY MORNING
Qty: 90 TABLET | Refills: 1 | Status: SHIPPED | OUTPATIENT
Start: 2025-04-08

## 2025-04-08 RX ORDER — LOSARTAN POTASSIUM 50 MG/1
50 TABLET ORAL DAILY
Qty: 90 TABLET | Refills: 2 | Status: SHIPPED | OUTPATIENT
Start: 2025-04-08

## 2025-04-08 RX ORDER — LOSARTAN POTASSIUM 50 MG/1
50 TABLET ORAL DAILY
Qty: 90 TABLET | Refills: 2 | Status: SHIPPED | OUTPATIENT
Start: 2025-04-08 | End: 2025-04-08

## 2025-05-27 DIAGNOSIS — E03.9 ACQUIRED HYPOTHYROIDISM: Primary | ICD-10-CM

## 2025-05-27 RX ORDER — LEVOTHYROXINE SODIUM 75 UG/1
TABLET ORAL
Qty: 180 TABLET | Refills: 1 | Status: CANCELLED | OUTPATIENT
Start: 2025-05-27

## 2025-05-27 RX ORDER — LEVOTHYROXINE SODIUM 75 UG/1
TABLET ORAL
Qty: 192 TABLET | Refills: 1 | Status: SHIPPED | OUTPATIENT
Start: 2025-05-27

## 2025-05-27 RX ORDER — LEVOTHYROXINE SODIUM 50 UG/1
TABLET ORAL
Qty: 145 TABLET | Refills: 1 | Status: SHIPPED | OUTPATIENT
Start: 2025-05-27

## 2025-05-27 NOTE — TELEPHONE ENCOUNTER
Hi,     I need a refill called in for Levothyroxine Sodium 75 MCG. I need that called into [facility] in [place], [place].     Thank you,

## 2025-06-09 ENCOUNTER — PATIENT MESSAGE (OUTPATIENT)
Dept: FAMILY MEDICINE CLINIC | Age: 65
End: 2025-06-09

## 2025-06-09 RX ORDER — SIMVASTATIN 40 MG
40 TABLET ORAL DAILY
Qty: 90 TABLET | Refills: 1 | Status: SHIPPED | OUTPATIENT
Start: 2025-06-09

## 2025-06-30 DIAGNOSIS — K21.9 GASTROESOPHAGEAL REFLUX DISEASE WITHOUT ESOPHAGITIS: ICD-10-CM

## 2025-06-30 RX ORDER — OMEPRAZOLE 40 MG/1
40 CAPSULE, DELAYED RELEASE ORAL DAILY
Qty: 30 CAPSULE | Refills: 2 | Status: SHIPPED | OUTPATIENT
Start: 2025-06-30

## 2025-07-07 DIAGNOSIS — I10 PRIMARY HYPERTENSION: ICD-10-CM

## 2025-07-07 RX ORDER — LOSARTAN POTASSIUM 50 MG/1
50 TABLET ORAL DAILY
Qty: 90 TABLET | Refills: 2 | Status: SHIPPED | OUTPATIENT
Start: 2025-07-07

## (undated) DEVICE — PADDING UNDERCAST W4INXL4YD 100% COT CRIMPED FINISH WBRL II

## (undated) DEVICE — NEEDLE HYPO 25GA L1.5IN BVL ORIENTED ECLIPSE

## (undated) DEVICE — BANDAGE COMPR W4INXL12FT E DISP ESMARCH EVEN

## (undated) DEVICE — ELECTRODE PT RET AD L9FT HI MOIST COND ADH HYDRGEL CORDED

## (undated) DEVICE — ZIMMER® STERILE DISPOSABLE TOURNIQUET CUFF WITH PLC, DUAL PORT, SINGLE BLADDER, 18 IN. (46 CM)

## (undated) DEVICE — NEEDLE HYPO 18GA L1.5IN THN WALL PIVOTING SHLD BVL ORIENTED

## (undated) DEVICE — BANDAGE,GAUZE,CONFORMING,4"X75",STRL,LF: Brand: MEDLINE

## (undated) DEVICE — INTENDED FOR TISSUE SEPARATION, AND OTHER PROCEDURES THAT REQUIRE A SHARP SURGICAL BLADE TO PUNCTURE OR CUT.: Brand: BARD-PARKER ® STAINLESS STEEL BLADES

## (undated) DEVICE — COVER LT HNDL BLU PLAS

## (undated) DEVICE — T-DRAPE,EXTREMITY,STERILE: Brand: MEDLINE

## (undated) DEVICE — CONTAINER SPEC 16OZ W/LID

## (undated) DEVICE — SHEET,DRAPE,53X77,STERILE: Brand: MEDLINE

## (undated) DEVICE — PAD,ABDOMINAL,8"X7.5",STERILE,LF,1/PK: Brand: MEDLINE

## (undated) DEVICE — SUTURE VCRL SZ 3-0 L27IN ABSRB UD L26MM SH 1/2 CIR J416H

## (undated) DEVICE — 4-PORT MANIFOLD: Brand: NEPTUNE 2

## (undated) DEVICE — KIT OR ROOM TURNOVER W/STRAP

## (undated) DEVICE — 3M™ COBAN™ NL STERILE NON-LATEX SELF-ADHERENT WRAP, 2084S, 4 IN X 5 YD (10 CM X 4,5 M), 18 ROLLS/CASE: Brand: 3M™ COBAN™

## (undated) DEVICE — MAJOR SET UP PK

## (undated) DEVICE — SOLUTION IV IRRIG POUR BRL 0.9% SODIUM CHL 2F7124

## (undated) DEVICE — SYRINGE MED 10ML LUERLOCK TIP W/O SFTY DISP

## (undated) DEVICE — HANDPIECE SET WITH HIGH FLOW TIP AND SUCTION TUBE: Brand: INTERPULSE

## (undated) DEVICE — SPONGE GZ W4XL4IN COT 12 PLY TYP VII WVN C FLD DSGN

## (undated) DEVICE — Z DISCONTINUED BY MEDLINE USE 2711682 TRAY SKIN PREP DRY W/ PREM GLV

## (undated) DEVICE — GOWN SIRUS NONREIN XL W/TWL: Brand: MEDLINE INDUSTRIES, INC.

## (undated) DEVICE — SUTURE ETHLN SZ 3-0 L18IN NONABSORBABLE BLK FS-1 L24MM 3/8 663H

## (undated) DEVICE — BANDAGE,GAUZE,BULKEE II,4.5"X4.1YD,STRL: Brand: MEDLINE

## (undated) DEVICE — Z INACTIVE USE 2660664 SOLUTION IRRIG 3000ML 0.9% SOD CHL USP UROMATIC PLAS CONT

## (undated) DEVICE — STOCKINETTE IMPERV M 9X44IN POLY INNR LAYR COT ORTH EXT